# Patient Record
Sex: FEMALE | Race: WHITE | Employment: OTHER | URBAN - METROPOLITAN AREA
[De-identification: names, ages, dates, MRNs, and addresses within clinical notes are randomized per-mention and may not be internally consistent; named-entity substitution may affect disease eponyms.]

---

## 2017-01-20 PROBLEM — B00.1 FEVER BLISTER: Status: ACTIVE | Noted: 2017-01-20

## 2017-07-06 ENCOUNTER — HOSPITAL ENCOUNTER (OUTPATIENT)
Dept: MAMMOGRAPHY | Age: 62
Discharge: HOME | End: 2017-07-06
Attending: FAMILY MEDICINE
Payer: COMMERCIAL

## 2017-07-06 DIAGNOSIS — Z12.31 VISIT FOR SCREENING MAMMOGRAM: ICD-10-CM

## 2017-07-06 PROCEDURE — 77067 SCR MAMMO BI INCL CAD: CPT

## 2018-08-22 ENCOUNTER — HOSPITAL ENCOUNTER (OUTPATIENT)
Dept: MAMMOGRAPHY | Age: 63
Discharge: HOME | End: 2018-08-22
Attending: FAMILY MEDICINE
Payer: MEDICARE

## 2018-08-22 DIAGNOSIS — Z12.31 VISIT FOR SCREENING MAMMOGRAM: ICD-10-CM

## 2018-08-22 PROCEDURE — 77067 SCR MAMMO BI INCL CAD: CPT

## 2018-11-20 ENCOUNTER — HOSPITAL ENCOUNTER (OUTPATIENT)
Dept: LAB | Age: 63
Discharge: HOME | End: 2018-11-20

## 2018-11-20 PROCEDURE — 88305 TISSUE EXAM BY PATHOLOGIST: CPT

## 2019-05-27 ENCOUNTER — HOSPITAL ENCOUNTER (EMERGENCY)
Age: 64
Discharge: HOME | End: 2019-05-27
Attending: EMERGENCY MEDICINE | Admitting: EMERGENCY MEDICINE
Payer: MEDICARE

## 2019-05-27 ENCOUNTER — APPOINTMENT (OUTPATIENT)
Dept: CT IMAGING | Age: 64
End: 2019-05-27
Attending: EMERGENCY MEDICINE
Payer: MEDICARE

## 2019-05-27 VITALS
RESPIRATION RATE: 15 BRPM | DIASTOLIC BLOOD PRESSURE: 67 MMHG | WEIGHT: 151 LBS | HEIGHT: 64 IN | OXYGEN SATURATION: 98 % | TEMPERATURE: 98.5 F | BODY MASS INDEX: 25.78 KG/M2 | HEART RATE: 66 BPM | SYSTOLIC BLOOD PRESSURE: 143 MMHG

## 2019-05-27 DIAGNOSIS — E87.6 HYPOKALEMIA: Primary | ICD-10-CM

## 2019-05-27 DIAGNOSIS — R47.1 DYSARTHRIA: ICD-10-CM

## 2019-05-27 DIAGNOSIS — R25.2 MUSCLE CRAMPS: ICD-10-CM

## 2019-05-27 LAB
ALBUMIN SERPL-MCNC: 3.9 G/DL (ref 3.2–4.6)
ALBUMIN/GLOB SERPL: 1.2 {RATIO} (ref 1.2–3.5)
ALP SERPL-CCNC: 92 U/L (ref 50–136)
ALT SERPL-CCNC: 29 U/L (ref 12–65)
ANION GAP SERPL CALC-SCNC: 13 MMOL/L (ref 7–16)
AST SERPL-CCNC: 22 U/L (ref 15–37)
ATRIAL RATE: 92 BPM
BASOPHILS # BLD: 0 K/UL (ref 0–0.2)
BASOPHILS NFR BLD: 0 % (ref 0–2)
BILIRUB DIRECT SERPL-MCNC: 0.1 MG/DL
BILIRUB SERPL-MCNC: 0.5 MG/DL (ref 0.2–1.1)
BUN SERPL-MCNC: 25 MG/DL (ref 8–23)
CALCIUM SERPL-MCNC: 8.7 MG/DL (ref 8.3–10.4)
CALCULATED P AXIS, ECG09: 45 DEGREES
CALCULATED R AXIS, ECG10: 22 DEGREES
CALCULATED T AXIS, ECG11: 34 DEGREES
CHLORIDE SERPL-SCNC: 107 MMOL/L (ref 98–107)
CO2 SERPL-SCNC: 22 MMOL/L (ref 21–32)
CREAT SERPL-MCNC: 0.9 MG/DL (ref 0.6–1)
DIAGNOSIS, 93000: NORMAL
DIFFERENTIAL METHOD BLD: NORMAL
EOSINOPHIL # BLD: 0.1 K/UL (ref 0–0.8)
EOSINOPHIL NFR BLD: 1 % (ref 0.5–7.8)
ERYTHROCYTE [DISTWIDTH] IN BLOOD BY AUTOMATED COUNT: 14.6 % (ref 11.9–14.6)
GLOBULIN SER CALC-MCNC: 3.3 G/DL (ref 2.3–3.5)
GLUCOSE BLD STRIP.AUTO-MCNC: 124 MG/DL (ref 65–100)
GLUCOSE SERPL-MCNC: 118 MG/DL (ref 65–100)
HCT VFR BLD AUTO: 43.9 % (ref 35.8–46.3)
HGB BLD-MCNC: 15 G/DL (ref 11.7–15.4)
IMM GRANULOCYTES # BLD AUTO: 0 K/UL (ref 0–0.5)
IMM GRANULOCYTES NFR BLD AUTO: 0 % (ref 0–5)
INR BLD: 1 (ref 0.9–1.2)
INR PPP: 1
LYMPHOCYTES # BLD: 3 K/UL (ref 0.5–4.6)
LYMPHOCYTES NFR BLD: 31 % (ref 13–44)
MCH RBC QN AUTO: 29.2 PG (ref 26.1–32.9)
MCHC RBC AUTO-ENTMCNC: 34.2 G/DL (ref 31.4–35)
MCV RBC AUTO: 85.4 FL (ref 79.6–97.8)
MONOCYTES # BLD: 0.7 K/UL (ref 0.1–1.3)
MONOCYTES NFR BLD: 7 % (ref 4–12)
NEUTS SEG # BLD: 5.6 K/UL (ref 1.7–8.2)
NEUTS SEG NFR BLD: 60 % (ref 43–78)
NRBC # BLD: 0 K/UL (ref 0–0.2)
P-R INTERVAL, ECG05: 156 MS
PLATELET # BLD AUTO: 285 K/UL (ref 150–450)
PMV BLD AUTO: 9.5 FL (ref 9.4–12.3)
POTASSIUM SERPL-SCNC: 2.7 MMOL/L (ref 3.5–5.1)
PROT SERPL-MCNC: 7.2 G/DL (ref 6.3–8.2)
PROTHROMBIN TIME: 12.6 SEC (ref 11.7–14.5)
PT BLD: 11.8 SECS (ref 9.6–11.6)
Q-T INTERVAL, ECG07: 326 MS
QRS DURATION, ECG06: 90 MS
QTC CALCULATION (BEZET), ECG08: 403 MS
RBC # BLD AUTO: 5.14 M/UL (ref 4.05–5.2)
SODIUM SERPL-SCNC: 142 MMOL/L (ref 136–145)
TROPONIN I BLD-MCNC: 0.01 NG/ML (ref 0.02–0.05)
VENTRICULAR RATE, ECG03: 92 BPM
WBC # BLD AUTO: 9.4 K/UL (ref 4.3–11.1)

## 2019-05-27 PROCEDURE — 94762 N-INVAS EAR/PLS OXIMTRY CONT: CPT | Performed by: EMERGENCY MEDICINE

## 2019-05-27 PROCEDURE — 80048 BASIC METABOLIC PNL TOTAL CA: CPT

## 2019-05-27 PROCEDURE — 93005 ELECTROCARDIOGRAM TRACING: CPT | Performed by: EMERGENCY MEDICINE

## 2019-05-27 PROCEDURE — 84484 ASSAY OF TROPONIN QUANT: CPT

## 2019-05-27 PROCEDURE — 74011250636 HC RX REV CODE- 250/636: Performed by: EMERGENCY MEDICINE

## 2019-05-27 PROCEDURE — 96366 THER/PROPH/DIAG IV INF ADDON: CPT | Performed by: EMERGENCY MEDICINE

## 2019-05-27 PROCEDURE — 85610 PROTHROMBIN TIME: CPT

## 2019-05-27 PROCEDURE — 99285 EMERGENCY DEPT VISIT HI MDM: CPT | Performed by: EMERGENCY MEDICINE

## 2019-05-27 PROCEDURE — 80076 HEPATIC FUNCTION PANEL: CPT

## 2019-05-27 PROCEDURE — 85025 COMPLETE CBC W/AUTO DIFF WBC: CPT

## 2019-05-27 PROCEDURE — 74011250637 HC RX REV CODE- 250/637: Performed by: EMERGENCY MEDICINE

## 2019-05-27 PROCEDURE — 70450 CT HEAD/BRAIN W/O DYE: CPT

## 2019-05-27 PROCEDURE — 82962 GLUCOSE BLOOD TEST: CPT

## 2019-05-27 PROCEDURE — 96365 THER/PROPH/DIAG IV INF INIT: CPT | Performed by: EMERGENCY MEDICINE

## 2019-05-27 RX ORDER — POTASSIUM CHLORIDE 14.9 MG/ML
20 INJECTION INTRAVENOUS
Status: COMPLETED | OUTPATIENT
Start: 2019-05-27 | End: 2019-05-27

## 2019-05-27 RX ORDER — POTASSIUM CHLORIDE 20 MEQ/1
40 TABLET, EXTENDED RELEASE ORAL
Status: COMPLETED | OUTPATIENT
Start: 2019-05-27 | End: 2019-05-27

## 2019-05-27 RX ORDER — POTASSIUM CHLORIDE 1500 MG/1
20 TABLET, FILM COATED, EXTENDED RELEASE ORAL 2 TIMES DAILY
Qty: 6 TAB | Refills: 0 | Status: SHIPPED | OUTPATIENT
Start: 2019-05-27 | End: 2021-07-15

## 2019-05-27 RX ORDER — POTASSIUM CHLORIDE 29.8 MG/ML
20 INJECTION INTRAVENOUS
Status: DISCONTINUED | OUTPATIENT
Start: 2019-05-27 | End: 2019-05-27

## 2019-05-27 RX ORDER — SODIUM CHLORIDE 0.9 % (FLUSH) 0.9 %
5-40 SYRINGE (ML) INJECTION EVERY 8 HOURS
Status: DISCONTINUED | OUTPATIENT
Start: 2019-05-27 | End: 2019-05-27 | Stop reason: HOSPADM

## 2019-05-27 RX ORDER — POTASSIUM CHLORIDE 14.9 MG/ML
20 INJECTION INTRAVENOUS
Status: DISCONTINUED | OUTPATIENT
Start: 2019-05-27 | End: 2019-05-27

## 2019-05-27 RX ORDER — SODIUM CHLORIDE 0.9 % (FLUSH) 0.9 %
5-40 SYRINGE (ML) INJECTION AS NEEDED
Status: DISCONTINUED | OUTPATIENT
Start: 2019-05-27 | End: 2019-05-27 | Stop reason: HOSPADM

## 2019-05-27 RX ADMIN — POTASSIUM CHLORIDE 40 MEQ: 20 TABLET, EXTENDED RELEASE ORAL at 12:55

## 2019-05-27 RX ADMIN — SODIUM CHLORIDE 1000 ML: 900 INJECTION, SOLUTION INTRAVENOUS at 11:47

## 2019-05-27 RX ADMIN — POTASSIUM CHLORIDE 20 MEQ: 200 INJECTION, SOLUTION INTRAVENOUS at 13:41

## 2019-05-27 NOTE — DISCHARGE INSTRUCTIONS
Hypokalemia: Care Instructions  Your Care Instructions    Hypokalemia (say \"oz-ci-xil-MARYANA-alejandrina-uh\") is a low level of potassium. The heart, muscles, kidneys, and nervous system all need potassium to work well. This problem has many different causes. Kidney problems, diet, and medicines like diuretics and laxatives can cause it. So can vomiting or diarrhea. In some cases, cancer is the cause. Your doctor may do tests to find the cause of your low potassium levels. You may need medicines to bring your potassium levels back to normal. You may also need regular blood tests to check your potassium. If you have very low potassium, you may need intravenous (IV) medicines. You also may need tests to check the electrical activity of your heart. Heart problems caused by low potassium levels can be very serious. Follow-up care is a key part of your treatment and safety. Be sure to make and go to all appointments, and call your doctor if you are having problems. It's also a good idea to know your test results and keep a list of the medicines you take. How can you care for yourself at home? · If your doctor recommends it, eat foods that have a lot of potassium. These include fresh fruits, juices, and vegetables. They also include nuts, beans, and milk. · Be safe with medicines. If your doctor prescribes medicines or potassium supplements, take them exactly as directed. Call your doctor if you have any problems with your medicines. · Get your potassium levels tested as often as your doctor tells you. When should you call for help? Call 911 anytime you think you may need emergency care. For example, call if:    · You feel like your heart is missing beats. Heart problems caused by low potassium can cause death.     · You passed out (lost consciousness).     · You have a seizure.    Call your doctor now or seek immediate medical care if:    · You feel weak or unusually tired.     · You have severe arm or leg cramps.   · You have tingling or numbness.     · You feel sick to your stomach, or you vomit.     · You have belly cramps.     · You feel bloated or constipated.     · You have to urinate a lot.     · You feel very thirsty most of the time.     · You are dizzy or lightheaded, or you feel like you may faint.     · You feel depressed, or you lose touch with reality.    Watch closely for changes in your health, and be sure to contact your doctor if:    · You do not get better as expected. Where can you learn more? Go to http://kari-salinas.info/. Enter G358 in the search box to learn more about \"Hypokalemia: Care Instructions. \"  Current as of: March 14, 2018  Content Version: 11.9  © 3206-1326 [employer]. Care instructions adapted under license by [employer] (which disclaims liability or warranty for this information). If you have questions about a medical condition or this instruction, always ask your healthcare professional. [place] [address] any warranty or liability for your use of this information. Patient Education        Hypokalemia: Care Instructions  Your Care Instructions    Hypokalemia (say \"dx-jy-qcr-MARYANA-alejandrina-uh\") is a low level of potassium. The heart, muscles, kidneys, and nervous system all need potassium to work well. This problem has many different causes. Kidney problems, diet, and medicines like diuretics and laxatives can cause it. So can vomiting or diarrhea. In some cases, cancer is the cause. Your doctor may do tests to find the cause of your low potassium levels. You may need medicines to bring your potassium levels back to normal. You may also need regular blood tests to check your potassium. If you have very low potassium, you may need intravenous (IV) medicines. You also may need tests to check the electrical activity of your heart. Heart problems caused by low potassium levels can be very serious.   Follow-up care is a key part of your treatment and safety. Be sure to make and go to all appointments, and call your doctor if you are having problems. It's also a good idea to know your test results and keep a list of the medicines you take. How can you care for yourself at home? · If your doctor recommends it, eat foods that have a lot of potassium. These include fresh fruits, juices, and vegetables. They also include nuts, beans, and milk. · Be safe with medicines. If your doctor prescribes medicines or potassium supplements, take them exactly as directed. Call your doctor if you have any problems with your medicines. · Get your potassium levels tested as often as your doctor tells you. When should you call for help? Call 911 anytime you think you may need emergency care. For example, call if:    · You feel like your heart is missing beats. Heart problems caused by low potassium can cause death.     · You passed out (lost consciousness).     · You have a seizure.    Call your doctor now or seek immediate medical care if:    · You feel weak or unusually tired.     · You have severe arm or leg cramps.     · You have tingling or numbness.     · You feel sick to your stomach, or you vomit.     · You have belly cramps.     · You feel bloated or constipated.     · You have to urinate a lot.     · You feel very thirsty most of the time.     · You are dizzy or lightheaded, or you feel like you may faint.     · You feel depressed, or you lose touch with reality.    Watch closely for changes in your health, and be sure to contact your doctor if:    · You do not get better as expected. Where can you learn more? Go to http://kari-salinas.info/. Enter G358 in the search box to learn more about \"Hypokalemia: Care Instructions. \"  Current as of: March 14, 2018  Content Version: 11.9  © 0240-2178 [employer].  Care instructions adapted under license by [facility] (which disclaims liability or warranty for this information). If you have questions about a medical condition or this instruction, always ask your healthcare professional. [place] [address] any warranty or liability for your use of this information.

## 2019-05-27 NOTE — ED NOTES
I have reviewed discharge instructions with the patient. The patient verbalized understanding. Patient left ED via Discharge Method: ambulatory to Home with family. Opportunity for questions and clarification provided. Patient given 1 scripts. To continue your aftercare when you leave the hospital, you may receive an automated call from our care team to check in on how you are doing. This is a free service and part of our promise to provide the best care and service to meet your aftercare needs.  If you have questions, or wish to unsubscribe from this service please call [phone]. Thank you for Choosing our [address] [place] [address] Emergency Department.

## 2019-05-27 NOTE — PROGRESS NOTES
Visit with patient in ER. Mariel Palencia, staff   C: [phone] /  [email or web address]

## 2019-05-27 NOTE — ED PROVIDER NOTES
52-year-old white female with history of hypertension but not requiring any medications presents with approximately 15 minute episode of hands drawing up and difficulty speaking. Symptoms resolved spontaneously. Patient reports he thinks he was having difficulty speaking because of the \"drawing up of her body\". Patient reports that she has walked approximately 2 miles this morning and was doing work in the yard when the symptoms began. Her  reports her speech just seemed a bit slurred but her words were appropriate. No vision changes. No headache. The history is provided by the patient. Dysarthria   Pertinent negatives include no shortness of breath, no chest pain, no vomiting, no headaches and no nausea.         Past Medical History:   Diagnosis Date    Arthritis     GERD (gastroesophageal reflux disease)     well controlled with medication    Hypercholesteremia     medication    Hypertension     well controlled with medication    Obesity        Past Surgical History:   Procedure Laterality Date    HX BREAST REDUCTION      HX COLONOSCOPY  2016,2018    + polyp  repeat 2023    HX HYSTERECTOMY      HX KNEE REPLACEMENT Right     HX OTHER SURGICAL      tummy tuck         Family History:   Problem Relation Age of Onset    Hypertension Mother     Hypertension Father     Breast Cancer Neg Hx        Social History     Socioeconomic History    Marital status:      Spouse name: Not on file    Number of children: Not on file    Years of education: Not on file    Highest education level: Not on file   Occupational History    Not on file   Social Needs    Financial resource strain: Not on file    Food insecurity:     Worry: Not on file     Inability: Not on file    Transportation needs:     Medical: Not on file     Non-medical: Not on file   Tobacco Use    Smoking status: Never Smoker    Smokeless tobacco: Never Used   Substance and Sexual Activity    Alcohol use: Yes     Comment: occasional    Drug use: No    Sexual activity: Not on file   Lifestyle    Physical activity:     Days per week: Not on file     Minutes per session: Not on file    Stress: Not on file   Relationships    Social connections:     Talks on phone: Not on file     Gets together: Not on file     Attends Anabaptist service: Not on file     Active member of club or organization: Not on file     Attends meetings of clubs or organizations: Not on file     Relationship status: Not on file    Intimate partner violence:     Fear of current or ex partner: Not on file     Emotionally abused: Not on file     Physically abused: Not on file     Forced sexual activity: Not on file   Other Topics Concern    Not on file   Social History Narrative    Not on file         ALLERGIES: Adhesive tape and Adhesive tape-silicones    Review of Systems   Constitutional: Negative for fever. HENT: Negative for congestion. Eyes: Negative for visual disturbance. Respiratory: Negative for cough and shortness of breath. Cardiovascular: Negative for chest pain. Gastrointestinal: Negative for abdominal pain, nausea and vomiting. Genitourinary: Negative for dysuria. Musculoskeletal: Negative for back pain and neck pain. Skin: Negative for rash. Neurological: Negative for headaches. Vitals:    05/27/19 1136 05/27/19 1151 05/27/19 1201 05/27/19 1231   BP: (!) 192/96 167/87 152/81 152/73   Pulse: 93 81 79 69   Resp: 16 16 13 13   Temp: 98.5 °F (36.9 °C)      SpO2: 94% 94% 96% 97%   Weight: 68.5 kg (151 lb)      Height: 5' 4\" (1.626 m)               Physical Exam   Constitutional: She is oriented to person, place, and time. She appears well-developed and well-nourished. No distress. HENT:   Head: Normocephalic and atraumatic. Mouth/Throat: Oropharynx is clear and moist.   Eyes: Pupils are equal, round, and reactive to light. Conjunctivae and EOM are normal.   Neck: Normal range of motion. Neck supple.    Cardiovascular: Normal rate and regular rhythm. Pulmonary/Chest: Effort normal and breath sounds normal.   Abdominal: Soft. She exhibits no distension. There is no tenderness. Musculoskeletal: Normal range of motion. She exhibits no edema. Neurological: She is alert and oriented to person, place, and time. She displays normal reflexes. No cranial nerve deficit or sensory deficit. She exhibits normal muscle tone. Coordination normal.   Skin: Skin is warm and dry. Psychiatric: She has a normal mood and affect. Her behavior is normal.   Nursing note and vitals reviewed. MDM  Number of Diagnoses or Management Options  Diagnosis management comments: Lab work unremarkable except for mild hypokalemia at 2.7. EKG normal sinus rhythm. Head CT no acute abnormality. Blood pressure has improved without intervention. Patient is very comfortable in appearance and has normal neuro exam.  I suspect her lateral extremity drawing and difficulty speaking or related to hypokalemia. Do not feel that admission is necessary at this time. Have her placed this orally and IV. We'll discharge home on [place] [address] for 3 days. [address] follow-up with primary care this week.        Amount and/or Complexity of Data Reviewed  Clinical lab tests: ordered and reviewed  Tests in the radiology section of CPT®: ordered and reviewed  Tests in the medicine section of CPT®: ordered and reviewed  Independent visualization of images, tracings, or specimens: yes    Risk of Complications, Morbidity, and/or Mortality  Presenting problems: moderate  Diagnostic procedures: moderate  Management options: moderate           Procedures

## 2019-05-27 NOTE — ED TRIAGE NOTES
Pt here via ems from home for slurred speech and contractures that started at 10AM. Pt states speech has improved and sx have resolved at this time. Cinn negative. Pt is alert and oriented x 4. Ems vs: 180/100 HR 90's .

## 2019-12-17 ENCOUNTER — HOSPITAL ENCOUNTER (OUTPATIENT)
Dept: MAMMOGRAPHY | Age: 64
Discharge: HOME | End: 2019-12-17
Attending: FAMILY MEDICINE
Payer: MEDICARE

## 2019-12-17 DIAGNOSIS — M89.9 DISORDER OF BONE: ICD-10-CM

## 2019-12-17 DIAGNOSIS — Z12.31 VISIT FOR SCREENING MAMMOGRAM: ICD-10-CM

## 2019-12-17 PROCEDURE — 77067 SCR MAMMO BI INCL CAD: CPT

## 2019-12-17 PROCEDURE — 77080 DXA BONE DENSITY AXIAL: CPT

## 2020-11-20 ENCOUNTER — TRANSCRIBE ORDER (OUTPATIENT)
Dept: SCHEDULING | Age: 65
End: 2020-11-20

## 2020-11-20 DIAGNOSIS — Z12.31 VISIT FOR SCREENING MAMMOGRAM: Primary | ICD-10-CM

## 2020-12-18 ENCOUNTER — HOSPITAL ENCOUNTER (OUTPATIENT)
Dept: MAMMOGRAPHY | Age: 65
Discharge: HOME | End: 2020-12-18
Attending: FAMILY MEDICINE
Payer: MEDICARE

## 2020-12-18 DIAGNOSIS — Z12.31 VISIT FOR SCREENING MAMMOGRAM: ICD-10-CM

## 2020-12-18 PROCEDURE — 77067 SCR MAMMO BI INCL CAD: CPT

## 2021-05-26 LAB
LEFT VENTRICULAR EJECTION FRACTION HIGH VALUE: 65 %
LEFT VENTRICULAR EJECTION FRACTION MODE: NORMAL
LV EF: 55 %

## 2021-07-23 ENCOUNTER — HOSPITAL ENCOUNTER (OUTPATIENT)
Dept: ULTRASOUND IMAGING | Age: 66
Discharge: HOME | End: 2021-07-23
Attending: INTERNAL MEDICINE

## 2021-07-23 DIAGNOSIS — R22.1 NODULE OF NECK: ICD-10-CM

## 2021-07-26 NOTE — PROGRESS NOTES
Blayne Miller, please let patient know her ultrasound results showed a small cyst. If this is bothering her, I can refer her to a dermatologist to have it removed. It otherwise can be left alone.

## 2021-07-28 NOTE — PROGRESS NOTES
Patient notified of results and instructions, per Dr Israel Mortensen note, and voiced understanding. She states that it is not bothering much her at this time.

## 2021-10-20 PROBLEM — H49.00 OCULOMOTOR NERVE PALSY: Status: ACTIVE | Noted: 2021-10-20

## 2021-12-29 ENCOUNTER — TRANSCRIBE ORDER (OUTPATIENT)
Dept: SCHEDULING | Age: 66
End: 2021-12-29

## 2021-12-29 DIAGNOSIS — Z12.31 VISIT FOR SCREENING MAMMOGRAM: Primary | ICD-10-CM

## 2022-02-25 ENCOUNTER — HOSPITAL ENCOUNTER (OUTPATIENT)
Dept: MAMMOGRAPHY | Age: 67
Discharge: HOME | End: 2022-02-25
Attending: INTERNAL MEDICINE
Payer: MEDICARE

## 2022-02-25 DIAGNOSIS — Z12.31 VISIT FOR SCREENING MAMMOGRAM: ICD-10-CM

## 2022-02-25 PROCEDURE — 77067 SCR MAMMO BI INCL CAD: CPT

## 2022-03-16 ENCOUNTER — HOSPITAL ENCOUNTER (OUTPATIENT)
Dept: NUTRITION | Age: 67
Discharge: HOME | End: 2022-03-16
Payer: SELF-PAY

## 2022-03-16 PROCEDURE — 97802 MEDICAL NUTRITION INDIV IN: CPT

## 2022-03-16 NOTE — PROGRESS NOTES
Dilia Bergman, MS, RD, LD  Outpatient Registered Dietitian  [address] [facility] Outpatient Nutrition Counseling  Phone: [phone]  Fax: [phone]    ASSESSMENT  Pt is a 74 y.o. female referred for dietary counseling and education by Dr. Omi De La Rosa with the following diagnosis (es):  Overweight [E66.3]     Comes in today accompanied by her spouse, requesting support for weight reduction. Following ketogenic diet for 8 months, will \"cheat\" here and there\". Voices and shows weight increasing even though she is restricting her intake. C/o constipation since returning from [place], which she attribute eliminating her fruit intake since she returned, finds relief with stool softner. Finds she is low in energy, difficulty staying awake, sleeping during the day. Ready to consider alternative approach to ketogenic diet today for health and longevity. Factors identified today that impact current eating habits include confusion about nutrition advice, nutrition knowledge, and subscribing to diet culture. 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       Lab Results   Component Value Date/Time    Hemoglobin A1c 5.3 02/21/2019 09:02 AM    Hemoglobin A1c 5.3 08/21/2018 08:44 AM    Hemoglobin A1c 5.6 01/19/2018 10:32 AM    Glucose 118 (H) 05/27/2019 11:48 AM    Glucose (POC) 124 (H) 05/27/2019 11:33 AM    LDL, calculated 222 (H) 02/21/2019 09:02 AM    Creatinine 0.90 05/27/2019 11:48 AM      Lab Results   Component Value Date/Time    Cholesterol, total 294 (H) 02/21/2019 09:02 AM    HDL Cholesterol 55 02/21/2019 09:02 AM    LDL, calculated 222 (H) 02/21/2019 09:02 AM    Triglyceride 87 02/21/2019 09:02 AM     Lab Results   Component Value Date/Time    ALT (SGPT) 29 05/27/2019 11:48 AM    Alk.  phosphatase 92 05/27/2019 11:48 AM Bilirubin, direct 0.1 05/27/2019 11:48 AM    Bilirubin, total 0.5 05/27/2019 11:48 AM    Albumin 3.9 05/27/2019 11:48 AM    Protein, total 7.2 05/27/2019 11:48 AM    INR 1.0 05/27/2019 11:48 AM    INR (POC) 1.0 05/27/2019 11:41 AM    Prothrombin time 12.6 05/27/2019 11:48 AM    PLATELET 127 78/94/7310 11:48 AM     Meds: bumex, statin    Barriers to change: nutrition knowledge deficit and lacks applicable skills- cooking, meal planning, time management, etc.    Social/ Support: Spouse, who shares he has elevated blood sugars and needs to be careful. Huy Jaquez expresses she has been recommended not to cook foods due to early onset of dementia. Spouse is cooking meals at this time. Diet Recall: snacks on casimiro bars- causes her gas, peanuts. Breakfast: None  Lunch: None  Dinner: 2 ounces ham with cheese sandwich on \"diet bread\" + keto chips    Physical Activity: inconsistent - no set duration or frequency- starting to walk again with her neighbors. 2 miles. Sleep Habits: poor- voices nightmares. Behaviors indicate current stage of change is: Preparation. Anthropometrics:   Estimated body mass index is 29.18 kg/m² as calculated from the following:    Height as of 10/20/21: 5' 4\" (1.626 m). Weight as of 10/20/21: 77.1 kg (170 lb). - on bumix  IBW= 54.5kg      Estimated Nutrition Needs:  MSJ x 1.2= 1500 (-200 for weight reduction to start)= 1300 kcal/day - first aim to to consume 3 meals. Protein: 65g/day (20%)    Carbohydrate: 130g/day (40%)    Fat:58 g/day (40%)   Concerns for metabolic dysregulation due to chronic dieting, poor sleep    DIAGNOSIS:  Inadequate energy intake related to nutrition knowledge, related to desire to lose weight and subscribing to ketogenic diet culture    INTERVENTIONS:  1. 60 minute appointment    2.  Nutrition Prescription:  Modification of Meals and Snacks (RD Goals for pt):   - Increase servings of nonstarchy vegetables from baseline  - Replace saturated fat sources with unsaturated fat (nuts, seeds, avocado, olive oil)  - Decrease saturated fat intake to <13 grams per day   - Increase fiber intake to minimum of 25g/day (whole grains, fruit, vegetables)  - Daily- weekly consumption of dairy, poultry, eggs  - Consume fish 2 or more times weekly  - Limit refined carbohydrates, added sugar, sugar sweetened beverages, processed meats, and fried foods to 1-2x per week or less  - Water as primary beverage    3. Nutrition Education and Counseling   Utilized motivational interviewing, problem solving, self monitoring, cognitive restructuring, and goal setting in behavior change counseling   Reviewed MNT for weight management. Specifically emphasized the importance of eating 3 structured meals daily to support metabolism and general wellbeing.  Provided recommended portions of carbohydrate, protein, and fat as guide for meals and snacks.  Discussed recommendations in the context of MyPlate.  Application: created 3 structured meals together, applied food preferences to meal plan. Materials Provided and Discussed:  Individualized MyPlate, provided energy and macro nutrient needs. Cronometer nate recommended for tracking upon request.     Utilized the following behavior change strategies: Pt identified behaviors address and collaborated in goal setting,     Pt verbalized understanding of recommendations discussed. Anticipate fair compliance with recommendations. Goal: Pt will structure meals and change food choices to facilitate 3-5% weight loss within 1 months. -  Action Steps:           1. Track meals for 7 days          2. Eat 3 meals daily to support improvement in energy- Follow my plate structure to create meals           3. Incorporate fiber/protein rich carbohydrates back into diet - reviewed recommended list and servings daily. MONITORING & EVALUATION:  Monitor food/nutrient intake  Follow Up: 3 weeks.

## 2022-03-19 PROBLEM — H49.00 OCULOMOTOR NERVE PALSY: Status: ACTIVE | Noted: 2021-10-20

## 2022-03-20 PROBLEM — B00.1 FEVER BLISTER: Status: ACTIVE | Noted: 2017-01-20

## 2022-03-21 PROBLEM — R41.89 COGNITIVE IMPAIRMENT: Status: ACTIVE | Noted: 2022-03-21

## 2022-03-21 PROBLEM — F02.80 LEWY BODY DEMENTIA WITHOUT BEHAVIORAL DISTURBANCE (HCC): Status: ACTIVE | Noted: 2022-03-21

## 2022-03-21 PROBLEM — E87.6 HYPOKALEMIA: Status: ACTIVE | Noted: 2022-03-21

## 2022-03-21 PROBLEM — R73.03 PREDIABETES: Status: ACTIVE | Noted: 2022-03-21

## 2022-03-21 PROBLEM — I50.32 CHRONIC DIASTOLIC CONGESTIVE HEART FAILURE (HCC): Status: ACTIVE | Noted: 2022-03-21

## 2022-03-21 PROBLEM — B00.1 FEVER BLISTER: Status: RESOLVED | Noted: 2017-01-20 | Resolved: 2022-03-21

## 2022-03-21 PROBLEM — E03.9 ACQUIRED HYPOTHYROIDISM: Status: ACTIVE | Noted: 2022-03-21

## 2022-03-21 PROBLEM — G31.83 LEWY BODY DEMENTIA WITHOUT BEHAVIORAL DISTURBANCE (HCC): Status: ACTIVE | Noted: 2022-03-21

## 2022-03-24 PROBLEM — G31.83 LEWY BODY DEMENTIA WITHOUT BEHAVIORAL DISTURBANCE (HCC): Status: ACTIVE | Noted: 2022-03-21

## 2022-03-24 PROBLEM — E87.6 HYPOKALEMIA: Status: ACTIVE | Noted: 2022-03-21

## 2022-03-24 PROBLEM — E03.9 ACQUIRED HYPOTHYROIDISM: Status: ACTIVE | Noted: 2022-03-21

## 2022-03-24 PROBLEM — B00.1 FEVER BLISTER: Status: RESOLVED | Noted: 2017-01-20 | Resolved: 2022-03-21

## 2022-03-24 PROBLEM — R73.03 PREDIABETES: Status: ACTIVE | Noted: 2022-03-21

## 2022-03-24 PROBLEM — F02.80 LEWY BODY DEMENTIA WITHOUT BEHAVIORAL DISTURBANCE (HCC): Status: ACTIVE | Noted: 2022-03-21

## 2022-03-24 PROBLEM — I50.32 CHRONIC DIASTOLIC CONGESTIVE HEART FAILURE (HCC): Status: ACTIVE | Noted: 2022-03-21

## 2022-03-24 PROBLEM — R41.89 COGNITIVE IMPAIRMENT: Status: ACTIVE | Noted: 2022-03-21

## 2022-04-20 ENCOUNTER — TELEPHONE (OUTPATIENT)
Dept: NUTRITION | Age: 67
End: 2022-04-20

## 2022-04-20 NOTE — TELEPHONE ENCOUNTER
Nutrition Counseling: Contacted pt regarding referral. See notes documented in Nutrition Counseling Referral for details. No further follow-up contact from pt. Will close referral for this office.     [address] [facility]  [phone]

## 2022-05-12 PROBLEM — E87.6 HYPOKALEMIA: Status: RESOLVED | Noted: 2022-03-21 | Resolved: 2022-05-12

## 2022-05-12 PROBLEM — J30.1 SEASONAL ALLERGIC RHINITIS DUE TO POLLEN: Status: ACTIVE | Noted: 2022-05-12

## 2022-05-12 PROBLEM — K59.00 CONSTIPATION: Status: ACTIVE | Noted: 2022-05-12

## 2022-05-12 PROBLEM — N76.0 ACUTE VAGINITIS: Status: ACTIVE | Noted: 2022-05-12

## 2022-05-12 PROBLEM — K92.89 GAS BLOAT SYNDROME: Status: ACTIVE | Noted: 2022-05-12

## 2022-05-12 PROBLEM — N18.30 CHRONIC RENAL DISEASE, STAGE III (HCC): Status: ACTIVE | Noted: 2022-05-12

## 2022-05-27 ENCOUNTER — PATIENT MESSAGE (OUTPATIENT)
Dept: INTERNAL MEDICINE CLINIC | Facility: CLINIC | Age: 67
End: 2022-05-27

## 2022-05-27 RX ORDER — METOPROLOL SUCCINATE 50 MG/1
50 TABLET, EXTENDED RELEASE ORAL DAILY
Qty: 90 TABLET | Refills: 0 | Status: SHIPPED | OUTPATIENT
Start: 2022-05-27 | End: 2022-09-07 | Stop reason: SDUPTHER

## 2022-05-27 RX ORDER — BUMETANIDE 1 MG/1
1 TABLET ORAL DAILY
Qty: 90 TABLET | Refills: 0 | Status: SHIPPED | OUTPATIENT
Start: 2022-05-27 | End: 2022-08-22

## 2022-05-27 NOTE — TELEPHONE ENCOUNTER
From: Sherrie Armijo  To: Dr. Bianca Esteban  Sent: 5/27/2022 9:34 AM EDT  Subject: Refills needed    I have run out of my Bumetanide 1mg tablets and my Metropolo 50 mg pills and it says they are not renewable online. Please send refills to [facility] on [address].     Annetta Flores

## 2022-06-06 ENCOUNTER — TELEPHONE (OUTPATIENT)
Dept: INTERNAL MEDICINE CLINIC | Facility: CLINIC | Age: 67
End: 2022-06-06

## 2022-06-06 NOTE — TELEPHONE ENCOUNTER
Spoke with patient advised per  he will need to be seen and he doesn't have any availability this week.  Gave appt with Heather,Np for tomorrow at 3:20 pm.

## 2022-06-07 ENCOUNTER — OFFICE VISIT (OUTPATIENT)
Dept: INTERNAL MEDICINE CLINIC | Facility: CLINIC | Age: 67
End: 2022-06-07
Payer: MEDICARE

## 2022-06-07 VITALS
BODY MASS INDEX: 30.22 KG/M2 | TEMPERATURE: 97.4 F | OXYGEN SATURATION: 100 % | DIASTOLIC BLOOD PRESSURE: 63 MMHG | HEART RATE: 52 BPM | SYSTOLIC BLOOD PRESSURE: 145 MMHG | WEIGHT: 177 LBS | HEIGHT: 64 IN

## 2022-06-07 DIAGNOSIS — N76.0 ACUTE VAGINITIS: Primary | ICD-10-CM

## 2022-06-07 PROCEDURE — 1123F ACP DISCUSS/DSCN MKR DOCD: CPT | Performed by: NURSE PRACTITIONER

## 2022-06-07 PROCEDURE — 99213 OFFICE O/P EST LOW 20 MIN: CPT | Performed by: NURSE PRACTITIONER

## 2022-06-07 RX ORDER — METRONIDAZOLE 500 MG/1
500 TABLET ORAL 2 TIMES DAILY
Qty: 14 TABLET | Refills: 0 | Status: SHIPPED | OUTPATIENT
Start: 2022-06-07 | End: 2022-06-14

## 2022-06-07 RX ORDER — FLUCONAZOLE 150 MG/1
150 TABLET ORAL ONCE
Qty: 1 TABLET | Refills: 0 | Status: SHIPPED | OUTPATIENT
Start: 2022-06-07 | End: 2022-06-07

## 2022-06-07 SDOH — ECONOMIC STABILITY: FOOD INSECURITY: WITHIN THE PAST 12 MONTHS, THE FOOD YOU BOUGHT JUST DIDN'T LAST AND YOU DIDN'T HAVE MONEY TO GET MORE.: NEVER TRUE

## 2022-06-07 SDOH — ECONOMIC STABILITY: FOOD INSECURITY: WITHIN THE PAST 12 MONTHS, YOU WORRIED THAT YOUR FOOD WOULD RUN OUT BEFORE YOU GOT MONEY TO BUY MORE.: NEVER TRUE

## 2022-06-07 ASSESSMENT — ENCOUNTER SYMPTOMS
COUGH: 0
SHORTNESS OF BREATH: 0
ABDOMINAL PAIN: 0
CONSTIPATION: 0
EYE PAIN: 0
VOMITING: 0
BACK PAIN: 0
SINUS PAIN: 0
NAUSEA: 0
SORE THROAT: 0
RHINORRHEA: 0
DIARRHEA: 0

## 2022-06-07 ASSESSMENT — PATIENT HEALTH QUESTIONNAIRE - PHQ9
SUM OF ALL RESPONSES TO PHQ9 QUESTIONS 1 & 2: 0
SUM OF ALL RESPONSES TO PHQ QUESTIONS 1-9: 0
1. LITTLE INTEREST OR PLEASURE IN DOING THINGS: 0
SUM OF ALL RESPONSES TO PHQ QUESTIONS 1-9: 0
2. FEELING DOWN, DEPRESSED OR HOPELESS: 0

## 2022-06-07 ASSESSMENT — SOCIAL DETERMINANTS OF HEALTH (SDOH)
HOW HARD IS IT FOR YOU TO PAY FOR THE VERY BASICS LIKE FOOD, HOUSING, MEDICAL CARE, AND HEATING?: NOT HARD AT ALL
HOW HARD IS IT FOR YOU TO PAY FOR THE VERY BASICS LIKE FOOD, HOUSING, MEDICAL CARE, AND HEATING?: NOT HARD AT ALL

## 2022-06-07 NOTE — PATIENT INSTRUCTIONS
Patient Education        Vaginitis: Care Instructions  Overview     Vaginitis is soreness or infection of the vagina. This common problem can cause itching and burning. And it can cause a change in vaginal discharge. Sometimes it can cause pain during sex. Vaginitis may be caused by bacteria, yeast, or other germs. Some infections that cause it are caught from a sexual partner. Bath products, spermicides, and douches can irritate the vagina too. This problem can also happen during and after menopause. A drop in estrogenlevels during this time can cause dryness, soreness, and pain during sex. Your doctor can give you medicine to treat vaginitis. And home care may help you feel better. For certain types of infections, your sex partner(s) must betreated too. Follow-up care is a key part of your treatment and safety. Be sure to make and go to all appointments, and call your doctor if you are having problems. It's also a good idea to know your test results and keep alist of the medicines you take. How can you care for yourself at home?  Take your medicines exactly as prescribed. Call your doctor if you think you are having a problem with your medicine.  Ask your doctor if your sex partner(s) also needs treatment.  Do not eat or drink anything that has alcohol if you are taking metronidazole (Flagyl).  Wash your vulva daily with water. You also can use a mild, unscented soap if you want.  Do not use scented bath products. And do not use vaginal sprays or douches.  Change out of wet or damp clothes as soon as possible. Wear cotton underwear. And avoid tight clothing that could increase moisture.  Put a washcloth soaked in cool water on the area to relieve itching. Or you can take cool baths.  If you have dryness because of menopause, use estrogen cream or pills that your doctor prescribes.  Ask your doctor about when it is okay to have sex.  Use a personal lubricant before sex if you have dryness. Examples are Astroglide, K-Y Jelly, and Wet Lubricant Gel. When should you call for help? Call your doctor now or seek immediate medical care if:     You have a fever.      You have new or increased pain in your vagina or pelvis.      You have new or worse vaginal itching or discharge. Watch closely for changes in your health, and be sure to contact your doctor if:     You have bleeding other than your period.      You do not get better as expected. Where can you learn more? Go to https://ParentPluspeStroho.CoachUp. org and sign in to your [employer] account. Enter E623 in the Viewdle box to learn more about \"Vaginitis: Care Instructions. \"     If you do not have an account, please click on the \"Sign Up Now\" link. Current as of: November 22, 2021               Content Version: 13.2  © 3160-4790 [employer]. Care instructions adapted under license by [facility] [place] ([facility]). If you have questions about a medical condition or this instruction, always ask your healthcare professional. [address] any warranty or liability for your use of this information. Patient Education        Bacterial Vaginosis: Care Instructions  Overview     Bacterial vaginosis is a type of vaginal infection. It is caused by excess growth of certain bacteria that are normally found in the vagina. Symptoms can include itching, swelling, pain when you urinate or have sex, and a gray or yellow discharge with a \"fishy\" odor. It is not considered an infection that isspread through sexual contact. Symptoms can be annoying and uncomfortable. But bacterial vaginosis does not usually cause other health problems. However, if you have it while you arepregnant, it can cause complications. While the infection may go away on its own, most doctors use antibiotics to treat it.  You may have been prescribed pills or vaginal cream. With treatment,bacterial vaginosis usually clears up in 5 to 7 days.  Follow-up care is a key part of your treatment and safety. Be sure to make and go to all appointments, and call your doctor if you are having problems. It's also a good idea to know your test results and keep alist of the medicines you take. How can you care for yourself at home?  Take your antibiotics as directed. Do not stop taking them just because you feel better. You need to take the full course of antibiotics.  Do not eat or drink anything that contains alcohol if you are taking metronidazole or tinidazole.  Keep using your medicine if you start your period. Use pads instead of tampons while using a vaginal cream or suppository. Tampons can absorb the medicine.  Wear loose cotton clothing. Do not wear nylon and other materials that hold body heat and moisture close to the skin.  Do not scratch. Relieve itching with a cold pack or a cool bath.  Do not wash your vaginal area more than once a day. Use plain water or a mild, unscented soap. Do not douche. When should you call for help? Watch closely for changes in your health, and be sure to contact your doctor if:     You have unexpected vaginal bleeding.      You have a fever.      You have new or increased pain in your vagina or pelvis.      You are not getting better after 1 week.      Your symptoms return after you finish the course of your medicine. Where can you learn more? Go to https://WHI Solutionsammieeb.[employer]i Group. org and sign in to your [employer] account. Enter T423 in the [facility] box to learn more about \"Bacterial Vaginosis: Care Instructions. \"     If you do not have an account, please click on the \"Sign Up Now\" link. Current as of: November 22, 2021               Content Version: 13.2  © 7350-9506 Healthwise, LOG607. Care instructions adapted under license by [facility] ([facility]).  If you have questions about a medical condition or this instruction, always ask your healthcare professional. [employer] disclaims any warranty or liability for your use of this information.

## 2022-06-07 NOTE — PROGRESS NOTES
[address]  Tel# [phone]  Fax# [phone]       Jackson White, [facility]-BC  Family Nurse Practitioner            Date of Visit: 2022     Nelda Gonzales (: 1955) is a 69 y.o. female  patient, here for evaluation of the following chief complaint(s):    Chief Complaint   Patient presents with    Vaginal Discharge     Vaginal Discharge/ Rectal discharge          Patient Care Team:  Swathi Barriga MD as PCP - Go Brown MD as PCP - [facility] Empaneled Provider         History of Present Illness      Vaginal discharge  Presents here with complaint of recurring vaginal discharge; recurred about 1-1.5 weeks ago. Had a discharge back in 2022 when she was at the beach; had visit with me on 2022 re: vaginal discharge, and states that after taking Fluconazole, discharge resolved. States her spouse can smell a \"whiff\" of her vaginal discharge, states \"not bad\". Pt brought in her underwear, showing dried yellow discharge. States she just noticed 2 different spots on her underwear and thought the other spot came from her rectal area. Denies feeling any rectal discharge. Denies any vaginal itching or urinary symptoms. HTN  Last taken BP med around 9 am. States she just had eaten a chicken salad sandwich right before her appt today. States she also feels upset this afternoon re: one of her doctor's appt date.         Lab Results   Component Value Date    WBC 7.5 2022    HGB 13.8 2022    HCT 41.9 2022    MCV 89 2022     2022         Patient Active Problem List   Diagnosis    B12 deficiency    Oculomotor nerve palsy    Mixed hyperlipidemia    Essential hypertension    Prediabetes    Acquired hypothyroidism    Chronic diastolic congestive heart failure (HCC)    Cognitive impairment    Lewy body dementia without behavioral disturbance ([address]    Chronic renal disease, stage III ([address]    Constipation    Gas bloat syndrome    Acute vaginitis    Seasonal allergic rhinitis due to pollen       Past Medical History:   Diagnosis Date    Acquired hypothyroidism 3/21/2022    Arthritis     Chronic diastolic congestive heart failure ([address] 3/21/2022    GERD (gastroesophageal reflux disease)     well controlled with medication    Hypercholesteremia     medication    Hypertension     well controlled with medication    Hypokalemia 3/21/2022    Obesity        Past Surgical History:   Procedure Laterality Date    BREAST REDUCTION SURGERY      COLONOSCOPY  2016,2018    + polyp  repeat 2023    HYSTERECTOMY      OTHER SURGICAL HISTORY      tummy tuck    TOTAL KNEE ARTHROPLASTY Right        Family History   Problem Relation Age of Onset    Diabetes Father     Hypertension Mother     Dementia Mother     Hypertension Father     Parkinson's Disease Paternal Grandmother     Breast Cancer Neg Hx          Allergies   Allergen Reactions    Ace Inhibitors Other (See Comments)     cough    Adhesive Tape Other (See Comments)     Rash,paper tape OK           Current Outpatient Medications on File Prior to Visit   Medication Sig Dispense Refill    metoprolol succinate (TOPROL XL) 50 MG extended release tablet Take 1 tablet by mouth daily TAKE ONE TABLET BY MOUTH EVERY DAY 90 tablet 0    bumetanide (BUMEX) 1 MG tablet Take 1 tablet by mouth daily 90 tablet 0    aspirin 81 MG EC tablet Take 81 mg by mouth daily      atorvastatin (LIPITOR) 80 MG tablet Take 80 mg by mouth daily      candesartan (ATACAND) 32 MG tablet Take 32 mg by mouth daily      levothyroxine (SYNTHROID) 75 MCG tablet Take 75 mcg by mouth every morning (before breakfast)      potassium chloride (KLOR-CON) 10 MEQ extended release tablet Take 10 mEq by mouth daily      vitamin E 1000 units capsule Take 1,000 Units by mouth daily       No current facility-administered medications on file prior to visit.             Review of Systems  Review of Systems   Constitutional: Negative for chills, fatigue and fever. HENT: Negative for congestion, postnasal drip, rhinorrhea, sinus pain, sneezing and sore throat. Eyes: Negative for pain and visual disturbance. Respiratory: Negative for cough and shortness of breath. Cardiovascular: Negative for chest pain, palpitations and leg swelling. Gastrointestinal: Negative for abdominal pain, constipation, diarrhea, nausea and vomiting. Genitourinary: Positive for vaginal discharge. Negative for dysuria, frequency and urgency. Musculoskeletal: Negative for back pain, gait problem and joint swelling. Skin: Negative for rash and wound. Neurological: Negative for dizziness and headaches. Psychiatric/Behavioral: Negative for behavioral problems, sleep disturbance and suicidal ideas. The patient is not nervous/anxious. Vitals:    06/07/22 1509 06/07/22 1540   BP: (!) 168/71 (!) 145/63   Site: Left Upper Arm Right Upper Arm   Position: Sitting Sitting   Cuff Size: Large Adult Large Adult   Pulse: 52    Temp: 97.4 °F (36.3 °C)    TempSrc: Temporal    SpO2: 100%    Weight: 177 lb (80.3 kg)    Height: 5' 4\" (1.626 m)            Physical Exam  Physical Exam  Constitutional:       General: She is not in acute distress. Appearance: She is not ill-appearing. HENT:      Head: Normocephalic and atraumatic. Eyes:      Pupils: Pupils are equal, round, and reactive to light. Cardiovascular:      Rate and Rhythm: Normal rate and regular rhythm. Pulmonary:      Effort: Pulmonary effort is normal. No respiratory distress. Breath sounds: Normal breath sounds. Abdominal:      General: Bowel sounds are normal.      Palpations: Abdomen is soft. Musculoskeletal:         General: Normal range of motion. Cervical back: Neck supple. Skin:     General: Skin is warm and dry. Neurological:      General: No focal deficit present.       Mental Status: She is alert and oriented to person, place, and time. Psychiatric:         Mood and Affect: Mood normal.         Thought Content: Thought content normal.                Assessment/Plan:          ICD-10-CM    1. Acute vaginitis  N76.0 metroNIDAZOLE (FLAGYL) 500 MG tablet     fluconazole (DIFLUCAN) 150 MG tablet          Lizabeth Doss was seen today for vaginal discharge. Diagnoses and all orders for this visit:    Acute vaginitis  -     metroNIDAZOLE (FLAGYL) 500 MG tablet; Take 1 tablet by mouth 2 times daily for 7 days  -     fluconazole (DIFLUCAN) 150 MG tablet; Take 1 tablet by mouth once for 1 dose          Continue other meds and treatment plan by PCP and specialists. Return in about 22 days (around 6/29/2022), or if symptoms worsen or fail to improve, for PCP Dr. Taylor Ruggiero.              Kaylynn Pate, DNP, FNP-BC

## 2022-06-29 ENCOUNTER — OFFICE VISIT (OUTPATIENT)
Dept: INTERNAL MEDICINE CLINIC | Facility: CLINIC | Age: 67
End: 2022-06-29
Payer: MEDICARE

## 2022-06-29 VITALS
TEMPERATURE: 97.2 F | OXYGEN SATURATION: 99 % | DIASTOLIC BLOOD PRESSURE: 75 MMHG | HEIGHT: 64 IN | WEIGHT: 171 LBS | HEART RATE: 78 BPM | SYSTOLIC BLOOD PRESSURE: 136 MMHG | BODY MASS INDEX: 29.19 KG/M2

## 2022-06-29 DIAGNOSIS — N18.31 STAGE 3A CHRONIC KIDNEY DISEASE (HCC): ICD-10-CM

## 2022-06-29 DIAGNOSIS — I50.32 CHRONIC DIASTOLIC CONGESTIVE HEART FAILURE (HCC): ICD-10-CM

## 2022-06-29 DIAGNOSIS — G31.83 LEWY BODY DEMENTIA WITHOUT BEHAVIORAL DISTURBANCE (HCC): ICD-10-CM

## 2022-06-29 DIAGNOSIS — R73.03 PREDIABETES: ICD-10-CM

## 2022-06-29 DIAGNOSIS — E78.2 MIXED HYPERLIPIDEMIA: ICD-10-CM

## 2022-06-29 DIAGNOSIS — F02.80 LEWY BODY DEMENTIA WITHOUT BEHAVIORAL DISTURBANCE (HCC): ICD-10-CM

## 2022-06-29 DIAGNOSIS — I10 ESSENTIAL HYPERTENSION: Primary | ICD-10-CM

## 2022-06-29 DIAGNOSIS — I10 ESSENTIAL HYPERTENSION: ICD-10-CM

## 2022-06-29 DIAGNOSIS — E03.9 ACQUIRED HYPOTHYROIDISM: ICD-10-CM

## 2022-06-29 PROBLEM — N76.0 ACUTE VAGINITIS: Status: RESOLVED | Noted: 2022-05-12 | Resolved: 2022-06-29

## 2022-06-29 PROBLEM — J30.1 SEASONAL ALLERGIC RHINITIS DUE TO POLLEN: Status: RESOLVED | Noted: 2022-05-12 | Resolved: 2022-06-29

## 2022-06-29 PROBLEM — K92.89 GAS BLOAT SYNDROME: Status: RESOLVED | Noted: 2022-05-12 | Resolved: 2022-06-29

## 2022-06-29 PROBLEM — K59.00 CONSTIPATION: Status: RESOLVED | Noted: 2022-05-12 | Resolved: 2022-06-29

## 2022-06-29 PROBLEM — R41.89 COGNITIVE IMPAIRMENT: Status: RESOLVED | Noted: 2022-03-21 | Resolved: 2022-06-29

## 2022-06-29 PROCEDURE — 99214 OFFICE O/P EST MOD 30 MIN: CPT | Performed by: INTERNAL MEDICINE

## 2022-06-29 PROCEDURE — 1123F ACP DISCUSS/DSCN MKR DOCD: CPT | Performed by: INTERNAL MEDICINE

## 2022-06-29 RX ORDER — ESCITALOPRAM OXALATE 10 MG/1
10 TABLET ORAL DAILY
COMMUNITY
End: 2022-06-29

## 2022-06-29 ASSESSMENT — ANXIETY QUESTIONNAIRES
2. NOT BEING ABLE TO STOP OR CONTROL WORRYING: 0
6. BECOMING EASILY ANNOYED OR IRRITABLE: 0
5. BEING SO RESTLESS THAT IT IS HARD TO SIT STILL: 0
GAD7 TOTAL SCORE: 0
IF YOU CHECKED OFF ANY PROBLEMS ON THIS QUESTIONNAIRE, HOW DIFFICULT HAVE THESE PROBLEMS MADE IT FOR YOU TO DO YOUR WORK, TAKE CARE OF THINGS AT HOME, OR GET ALONG WITH OTHER PEOPLE: NOT DIFFICULT AT ALL
3. WORRYING TOO MUCH ABOUT DIFFERENT THINGS: 0
7. FEELING AFRAID AS IF SOMETHING AWFUL MIGHT HAPPEN: 0
4. TROUBLE RELAXING: 0
1. FEELING NERVOUS, ANXIOUS, OR ON EDGE: 0

## 2022-06-29 ASSESSMENT — ENCOUNTER SYMPTOMS
BLOOD IN STOOL: 0
SHORTNESS OF BREATH: 0

## 2022-06-29 ASSESSMENT — PATIENT HEALTH QUESTIONNAIRE - PHQ9
SUM OF ALL RESPONSES TO PHQ QUESTIONS 1-9: 0
SUM OF ALL RESPONSES TO PHQ QUESTIONS 1-9: 0
1. LITTLE INTEREST OR PLEASURE IN DOING THINGS: 0
SUM OF ALL RESPONSES TO PHQ9 QUESTIONS 1 & 2: 0
2. FEELING DOWN, DEPRESSED OR HOPELESS: 0
SUM OF ALL RESPONSES TO PHQ QUESTIONS 1-9: 0
SUM OF ALL RESPONSES TO PHQ QUESTIONS 1-9: 0

## 2022-06-29 NOTE — PATIENT INSTRUCTIONS
I recommend all standard healthcare maintenance and cancer screenings (Colon cancer screening, Mammogram and Bone Density if female and appropriate, etc) and standard immunizations (Flu, Pneumonia, Covid-19, Tetanus boosters, etc) as appropriate and due to lower your risk for potentially preventable morbidity/mortality from these diseases.

## 2022-06-29 NOTE — PROGRESS NOTES
Thelma Mcneil M.D. Internal Medicine  [address], [facility] [address]  Phone: [phone]  Fax: [phone]    Hypertension  This is a chronic problem. The current episode started more than 1 year ago. The problem has been waxing and waning since onset. The problem is controlled. Pertinent negatives include no chest pain or shortness of breath. There are no associated agents to hypertension. Risk factors for coronary artery disease include dyslipidemia, obesity and post-menopausal state. Past treatments include angiotensin blockers, beta blockers and diuretics. The current treatment provides moderate improvement. Kenya Krueger is a 69 y.o. White (non-) female. Current Outpatient Medications   Medication Sig Dispense Refill    metoprolol succinate (TOPROL XL) 50 MG extended release tablet Take 1 tablet by mouth daily TAKE ONE TABLET BY MOUTH EVERY DAY 90 tablet 0    bumetanide (BUMEX) 1 MG tablet Take 1 tablet by mouth daily 90 tablet 0    aspirin 81 MG EC tablet Take 81 mg by mouth daily      atorvastatin (LIPITOR) 80 MG tablet Take 80 mg by mouth daily      candesartan (ATACAND) 32 MG tablet Take 32 mg by mouth daily      levothyroxine (SYNTHROID) 75 MCG tablet Take 75 mcg by mouth every morning (before breakfast)      potassium chloride (KLOR-CON) 10 MEQ extended release tablet Take 10 mEq by mouth daily      vitamin E 1000 units capsule Take 1,000 Units by mouth daily       No current facility-administered medications for this visit.      Allergies   Allergen Reactions    Ace Inhibitors Other (See Comments)     cough    Adhesive Tape Other (See Comments)     Rash,paper tape OK    Sulfa Antibiotics      Other reaction(s): Unknown     Past Medical History:   Diagnosis Date    Acquired hypothyroidism 3/21/2022    Arthritis     Chronic diastolic congestive heart failure ([facility] 3/21/2022    GERD (gastroesophageal reflux disease)     well controlled with medication    Hypercholesteremia     medication    Hypertension     well controlled with medication    Hypokalemia 3/21/2022    Obesity      Past Surgical History:   Procedure Laterality Date    BREAST REDUCTION SURGERY      COLONOSCOPY  8538,0370    + polyp  repeat 2023    HYSTERECTOMY (CERVIX STATUS UNKNOWN)      OTHER SURGICAL HISTORY      tummy tuck    TOTAL KNEE ARTHROPLASTY Right      Social History     Tobacco Use    Smoking status: Never Smoker    Smokeless tobacco: Never Used   Substance Use Topics    Alcohol use: Yes    Drug use: No     Family History   Problem Relation Age of Onset    Diabetes Father     Hypertension Mother     Dementia Mother     Hypertension Father     Parkinson's Disease Paternal Grandmother     Breast Cancer Neg Hx       Review of Systems   Respiratory: Negative for shortness of breath. Cardiovascular: Negative for chest pain. Gastrointestinal: Negative for blood in stool. Physical Exam  Vitals and nursing note reviewed. Constitutional:       General: She is not in acute distress. Appearance: Normal appearance. She is obese. She is not ill-appearing, toxic-appearing or diaphoretic. HENT:      Head: Normocephalic and atraumatic. Right Ear: External ear normal.      Left Ear: External ear normal.   Eyes:      General: No scleral icterus. Right eye: No discharge. Left eye: No discharge. Conjunctiva/sclera: Conjunctivae normal.   Cardiovascular:      Rate and Rhythm: Normal rate and regular rhythm. Heart sounds: Normal heart sounds. No murmur heard. No friction rub. No gallop. Pulmonary:      Effort: Pulmonary effort is normal. No respiratory distress. Breath sounds: Normal breath sounds. No stridor. No wheezing, rhonchi or rales. Abdominal:      General: Abdomen is flat. Bowel sounds are normal. There is no distension. Palpations: Abdomen is soft. There is no mass. Tenderness:  There is no abdominal tenderness. There is no guarding or rebound. Musculoskeletal:      Right lower leg: No edema. Left lower leg: No edema. Skin:     General: Skin is warm and dry. Coloration: Skin is not jaundiced or pale. Findings: No erythema. Neurological:      General: No focal deficit present. Mental Status: She is alert and oriented to person, place, and time. Mental status is at baseline. Psychiatric:         Mood and Affect: Mood normal.         Behavior: Behavior normal.         Thought Content: Thought content normal.         Judgment: Judgment normal.     I have reviewed/discussed the above normal BMI with the patient. I have recommended the following interventions: dietary management education, guidance, and counseling and encourage exercise . ASSESSMENT AND PLAN:     · HTN: Taking current regimen (Atacand 32, Bumex 1 QDay, Toprol XL 50) w/o problems. Home BPs = 130s/70s. · HLD: Taking current regimen (Lipitor 80) w/o problems. · FLPs:   1. 3 on Lipitor 80 = [144/68/43/198]. · Hypothyroidism: Taking current regimen (Synthroid 75) w/o problems. · TSHs:  1. 3 on Synthroid 75 = 0.865. · Prediabetes:   · A1C's:  1. 1 =6.2.  2. 3/31/22 = 6.0.   · CKD3: Baseline Cr ? 1-1.6 and stable (Since 3/2022). · Diastolic CHF: Follows with Cardiology (Dr. Pat Head at [facility]). · Hypokalemia: Taking current regimen (PO KCl 10 QDay). · B12 Def:   1. 3/21/22 = 653. · Cognitive Impairment: Diagnosed with Lewy Body Dementia. Follows with Neurology (At [facility]). · HCM:   · CRC Screenin18 by Dr. Colin Tracey = IH/EH; 1 Fernando (HP); 5 Year Repeat. · Mammogram: 22 = Neg.   · Td/Shingrix: 22 advised to do at her pharmacy. · Flu:   · F/u: 3 Months. Non-fasting labs at that visit. Robert Meyer was seen today for hypertension and cholesterol problem.     Diagnoses and all orders for this visit:    Essential hypertension  -     Basic Metabolic Panel; Future  -     CBC with Auto Differential; Future  -     Hepatic Function Panel; Future    Mixed hyperlipidemia  -     Basic Metabolic Panel; Future  -     CBC with Auto Differential; Future  -     Hepatic Function Panel; Future    Acquired hypothyroidism    Prediabetes  -     Basic Metabolic Panel; Future    Chronic diastolic congestive heart failure ([facility]  -     Basic Metabolic Panel; Future  -     CBC with Auto Differential; Future  -     Hepatic Function Panel; Future    Lewy body dementia without behavioral disturbance ([facility]  -     Basic Metabolic Panel; Future  -     CBC with Auto Differential; Future  -     Hepatic Function Panel; Future    Stage 3a chronic kidney disease ([facility]  -     Basic Metabolic Panel; Future  -     CBC with Auto Differential; Future  -     Hepatic Function Panel;  Future

## 2022-06-30 LAB
ALBUMIN SERPL-MCNC: 4.2 G/DL (ref 3.2–4.6)
ALBUMIN/GLOB SERPL: 1.3 {RATIO} (ref 1.2–3.5)
ALP SERPL-CCNC: 93 U/L (ref 50–136)
ALT SERPL-CCNC: 41 U/L (ref 12–65)
ANION GAP SERPL CALC-SCNC: 9 MMOL/L (ref 7–16)
AST SERPL-CCNC: 29 U/L (ref 15–37)
BASOPHILS # BLD: 0 K/UL (ref 0–0.2)
BASOPHILS NFR BLD: 1 % (ref 0–2)
BILIRUB DIRECT SERPL-MCNC: 0.1 MG/DL
BILIRUB SERPL-MCNC: 0.5 MG/DL (ref 0.2–1.1)
BUN SERPL-MCNC: 39 MG/DL (ref 8–23)
CALCIUM SERPL-MCNC: 10.5 MG/DL (ref 8.3–10.4)
CHLORIDE SERPL-SCNC: 108 MMOL/L (ref 98–107)
CO2 SERPL-SCNC: 24 MMOL/L (ref 21–32)
CREAT SERPL-MCNC: 1.4 MG/DL (ref 0.6–1)
DIFFERENTIAL METHOD BLD: NORMAL
EOSINOPHIL # BLD: 0.1 K/UL (ref 0–0.8)
EOSINOPHIL NFR BLD: 1 % (ref 0.5–7.8)
ERYTHROCYTE [DISTWIDTH] IN BLOOD BY AUTOMATED COUNT: 14.1 % (ref 11.9–14.6)
GLOBULIN SER CALC-MCNC: 3.3 G/DL (ref 2.3–3.5)
GLUCOSE SERPL-MCNC: 109 MG/DL (ref 65–100)
HCT VFR BLD AUTO: 43.4 % (ref 35.8–46.3)
HGB BLD-MCNC: 14 G/DL (ref 11.7–15.4)
IMM GRANULOCYTES # BLD AUTO: 0 K/UL (ref 0–0.5)
IMM GRANULOCYTES NFR BLD AUTO: 0 % (ref 0–5)
LYMPHOCYTES # BLD: 2.8 K/UL (ref 0.5–4.6)
LYMPHOCYTES NFR BLD: 41 % (ref 13–44)
MCH RBC QN AUTO: 29.4 PG (ref 26.1–32.9)
MCHC RBC AUTO-ENTMCNC: 32.3 G/DL (ref 31.4–35)
MCV RBC AUTO: 91.2 FL (ref 79.6–97.8)
MONOCYTES # BLD: 0.6 K/UL (ref 0.1–1.3)
MONOCYTES NFR BLD: 9 % (ref 4–12)
NEUTS SEG # BLD: 3.4 K/UL (ref 1.7–8.2)
NEUTS SEG NFR BLD: 48 % (ref 43–78)
NRBC # BLD: 0 K/UL (ref 0–0.2)
PLATELET # BLD AUTO: 286 K/UL (ref 150–450)
PMV BLD AUTO: 10.5 FL (ref 9.4–12.3)
POTASSIUM SERPL-SCNC: 4.7 MMOL/L (ref 3.5–5.1)
PROT SERPL-MCNC: 7.5 G/DL (ref 6.3–8.2)
RBC # BLD AUTO: 4.76 M/UL (ref 4.05–5.2)
SODIUM SERPL-SCNC: 141 MMOL/L (ref 136–145)
WBC # BLD AUTO: 7 K/UL (ref 4.3–11.1)

## 2022-08-22 RX ORDER — BUMETANIDE 1 MG/1
1 TABLET ORAL DAILY
Qty: 90 TABLET | Refills: 0 | Status: SHIPPED | OUTPATIENT
Start: 2022-08-22 | End: 2022-10-05 | Stop reason: SDUPTHER

## 2022-08-22 NOTE — TELEPHONE ENCOUNTER
Pt was last seen 6/29/2022 and has a up coming appointment 10/5/2022.  Last Rx was sent in may for 90/0

## 2022-09-07 RX ORDER — POTASSIUM CHLORIDE 750 MG/1
10 TABLET, FILM COATED, EXTENDED RELEASE ORAL DAILY
Qty: 30 TABLET | Refills: 0 | Status: CANCELLED | OUTPATIENT
Start: 2022-09-07

## 2022-09-07 RX ORDER — LEVOTHYROXINE SODIUM 0.07 MG/1
75 TABLET ORAL DAILY
Qty: 30 TABLET | Refills: 0 | Status: CANCELLED | OUTPATIENT
Start: 2022-09-07

## 2022-09-07 RX ORDER — METOPROLOL SUCCINATE 50 MG/1
50 TABLET, EXTENDED RELEASE ORAL DAILY
Qty: 30 TABLET | Refills: 0 | Status: SHIPPED | OUTPATIENT
Start: 2022-09-07 | End: 2022-10-05 | Stop reason: SDUPTHER

## 2022-09-07 RX ORDER — BUMETANIDE 1 MG/1
1 TABLET ORAL DAILY
Qty: 90 TABLET | Refills: 0 | Status: CANCELLED | OUTPATIENT
Start: 2022-09-07

## 2022-09-07 RX ORDER — POTASSIUM CHLORIDE 750 MG/1
10 TABLET, FILM COATED, EXTENDED RELEASE ORAL DAILY
Qty: 30 TABLET | Refills: 0 | Status: SHIPPED | OUTPATIENT
Start: 2022-09-07 | End: 2022-10-05 | Stop reason: SDUPTHER

## 2022-09-07 RX ORDER — LEVOTHYROXINE SODIUM 0.07 MG/1
75 TABLET ORAL DAILY
Qty: 30 TABLET | Refills: 0 | Status: SHIPPED | OUTPATIENT
Start: 2022-09-07 | End: 2022-10-05 | Stop reason: SDUPTHER

## 2022-09-12 RX ORDER — POTASSIUM CHLORIDE 750 MG/1
10 TABLET, FILM COATED, EXTENDED RELEASE ORAL DAILY
Qty: 30 TABLET | Refills: 0 | OUTPATIENT
Start: 2022-09-12

## 2022-09-12 RX ORDER — METOPROLOL SUCCINATE 50 MG/1
50 TABLET, EXTENDED RELEASE ORAL DAILY
Qty: 30 TABLET | Refills: 0 | OUTPATIENT
Start: 2022-09-12

## 2022-09-12 RX ORDER — BUMETANIDE 1 MG/1
1 TABLET ORAL DAILY
Qty: 90 TABLET | Refills: 0 | OUTPATIENT
Start: 2022-09-12

## 2022-09-20 NOTE — TELEPHONE ENCOUNTER
Patient's  called concerning the patients med refills. He states that he submitted them a couple of weeks ago, and he hasn't heard anything from anyone. Patient has been out of medication for 3 or 4 days. Please contact and advise patient.

## 2022-09-22 RX ORDER — CANDESARTAN 32 MG/1
32 TABLET ORAL DAILY
Qty: 90 TABLET | Refills: 0 | Status: SHIPPED | OUTPATIENT
Start: 2022-09-22 | End: 2022-10-05 | Stop reason: SDUPTHER

## 2022-10-05 ENCOUNTER — OFFICE VISIT (OUTPATIENT)
Dept: INTERNAL MEDICINE CLINIC | Facility: CLINIC | Age: 67
End: 2022-10-05
Payer: MEDICARE

## 2022-10-05 VITALS
TEMPERATURE: 97.2 F | DIASTOLIC BLOOD PRESSURE: 81 MMHG | HEART RATE: 66 BPM | OXYGEN SATURATION: 99 % | BODY MASS INDEX: 29.02 KG/M2 | WEIGHT: 170 LBS | HEIGHT: 64 IN | SYSTOLIC BLOOD PRESSURE: 133 MMHG

## 2022-10-05 DIAGNOSIS — E87.6 HYPOKALEMIA: ICD-10-CM

## 2022-10-05 DIAGNOSIS — N18.31 STAGE 3A CHRONIC KIDNEY DISEASE (HCC): ICD-10-CM

## 2022-10-05 DIAGNOSIS — E78.2 MIXED HYPERLIPIDEMIA: ICD-10-CM

## 2022-10-05 DIAGNOSIS — K59.00 CONSTIPATION, UNSPECIFIED CONSTIPATION TYPE: ICD-10-CM

## 2022-10-05 DIAGNOSIS — E03.9 ACQUIRED HYPOTHYROIDISM: ICD-10-CM

## 2022-10-05 DIAGNOSIS — I10 ESSENTIAL HYPERTENSION: ICD-10-CM

## 2022-10-05 DIAGNOSIS — R73.03 PREDIABETES: ICD-10-CM

## 2022-10-05 DIAGNOSIS — Z00.00 MEDICARE ANNUAL WELLNESS VISIT, SUBSEQUENT: Primary | ICD-10-CM

## 2022-10-05 DIAGNOSIS — G31.83 LEWY BODY DEMENTIA WITHOUT BEHAVIORAL DISTURBANCE (HCC): ICD-10-CM

## 2022-10-05 DIAGNOSIS — M54.50 CHRONIC BILATERAL LOW BACK PAIN WITHOUT SCIATICA: ICD-10-CM

## 2022-10-05 DIAGNOSIS — G89.29 CHRONIC BILATERAL LOW BACK PAIN WITHOUT SCIATICA: ICD-10-CM

## 2022-10-05 DIAGNOSIS — I50.32 CHRONIC DIASTOLIC CONGESTIVE HEART FAILURE (HCC): ICD-10-CM

## 2022-10-05 DIAGNOSIS — F02.80 LEWY BODY DEMENTIA WITHOUT BEHAVIORAL DISTURBANCE (HCC): ICD-10-CM

## 2022-10-05 LAB
ALBUMIN SERPL-MCNC: 4.2 G/DL (ref 3.2–4.6)
ALBUMIN/GLOB SERPL: 1.4 {RATIO} (ref 1.2–3.5)
ALP SERPL-CCNC: 69 U/L (ref 50–136)
ALT SERPL-CCNC: 34 U/L (ref 12–65)
ANION GAP SERPL CALC-SCNC: 5 MMOL/L (ref 4–13)
AST SERPL-CCNC: 17 U/L (ref 15–37)
BASOPHILS # BLD: 0 K/UL (ref 0–0.2)
BASOPHILS NFR BLD: 0 % (ref 0–2)
BILIRUB DIRECT SERPL-MCNC: 0.2 MG/DL
BILIRUB SERPL-MCNC: 0.6 MG/DL (ref 0.2–1.1)
BUN SERPL-MCNC: 41 MG/DL (ref 8–23)
CALCIUM SERPL-MCNC: 10.2 MG/DL (ref 8.3–10.4)
CHLORIDE SERPL-SCNC: 109 MMOL/L (ref 101–110)
CO2 SERPL-SCNC: 26 MMOL/L (ref 21–32)
CREAT SERPL-MCNC: 1.2 MG/DL (ref 0.6–1)
DIFFERENTIAL METHOD BLD: ABNORMAL
EOSINOPHIL # BLD: 0.1 K/UL (ref 0–0.8)
EOSINOPHIL NFR BLD: 1 % (ref 0.5–7.8)
ERYTHROCYTE [DISTWIDTH] IN BLOOD BY AUTOMATED COUNT: 14.7 % (ref 11.9–14.6)
EST. AVERAGE GLUCOSE BLD GHB EST-MCNC: 114 MG/DL
GLOBULIN SER CALC-MCNC: 3 G/DL (ref 2.3–3.5)
GLUCOSE SERPL-MCNC: 102 MG/DL (ref 65–100)
HBA1C MFR BLD: 5.6 % (ref 4.8–5.6)
HCT VFR BLD AUTO: 45.4 % (ref 35.8–46.3)
HGB BLD-MCNC: 14.1 G/DL (ref 11.7–15.4)
IMM GRANULOCYTES # BLD AUTO: 0 K/UL (ref 0–0.5)
IMM GRANULOCYTES NFR BLD AUTO: 0 % (ref 0–5)
LYMPHOCYTES # BLD: 2.4 K/UL (ref 0.5–4.6)
LYMPHOCYTES NFR BLD: 35 % (ref 13–44)
MCH RBC QN AUTO: 29.4 PG (ref 26.1–32.9)
MCHC RBC AUTO-ENTMCNC: 31.1 G/DL (ref 31.4–35)
MCV RBC AUTO: 94.6 FL (ref 79.6–97.8)
MONOCYTES # BLD: 0.5 K/UL (ref 0.1–1.3)
MONOCYTES NFR BLD: 7 % (ref 4–12)
NEUTS SEG # BLD: 3.9 K/UL (ref 1.7–8.2)
NEUTS SEG NFR BLD: 56 % (ref 43–78)
NRBC # BLD: 0 K/UL (ref 0–0.2)
PLATELET # BLD AUTO: 281 K/UL (ref 150–450)
PMV BLD AUTO: 9.9 FL (ref 9.4–12.3)
POTASSIUM SERPL-SCNC: 4.7 MMOL/L (ref 3.5–5.1)
PROT SERPL-MCNC: 7.2 G/DL (ref 6.3–8.2)
RBC # BLD AUTO: 4.8 M/UL (ref 4.05–5.2)
SODIUM SERPL-SCNC: 140 MMOL/L (ref 136–145)
TSH, 3RD GENERATION: 0.94 UIU/ML (ref 0.36–3.74)
WBC # BLD AUTO: 7 K/UL (ref 4.3–11.1)

## 2022-10-05 PROCEDURE — G0439 PPPS, SUBSEQ VISIT: HCPCS | Performed by: INTERNAL MEDICINE

## 2022-10-05 PROCEDURE — 99214 OFFICE O/P EST MOD 30 MIN: CPT | Performed by: INTERNAL MEDICINE

## 2022-10-05 PROCEDURE — 1123F ACP DISCUSS/DSCN MKR DOCD: CPT | Performed by: INTERNAL MEDICINE

## 2022-10-05 RX ORDER — CANDESARTAN 32 MG/1
32 TABLET ORAL DAILY
Qty: 90 TABLET | Refills: 1 | Status: SHIPPED | OUTPATIENT
Start: 2022-10-05

## 2022-10-05 RX ORDER — POTASSIUM CHLORIDE 750 MG/1
10 TABLET, FILM COATED, EXTENDED RELEASE ORAL DAILY
Qty: 90 TABLET | Refills: 1 | Status: SHIPPED | OUTPATIENT
Start: 2022-10-05

## 2022-10-05 RX ORDER — DONEPEZIL HYDROCHLORIDE 10 MG/1
TABLET, FILM COATED ORAL
COMMUNITY
Start: 2022-09-12 | End: 2022-10-17 | Stop reason: SDUPTHER

## 2022-10-05 RX ORDER — METOPROLOL SUCCINATE 50 MG/1
50 TABLET, EXTENDED RELEASE ORAL DAILY
Qty: 90 TABLET | Refills: 1 | Status: SHIPPED | OUTPATIENT
Start: 2022-10-05

## 2022-10-05 RX ORDER — LEVOTHYROXINE SODIUM 0.07 MG/1
75 TABLET ORAL DAILY
Qty: 90 TABLET | Refills: 1 | Status: SHIPPED | OUTPATIENT
Start: 2022-10-05

## 2022-10-05 RX ORDER — ATORVASTATIN CALCIUM 40 MG/1
TABLET, FILM COATED ORAL
COMMUNITY
Start: 2022-09-12

## 2022-10-05 RX ORDER — BUMETANIDE 1 MG/1
1 TABLET ORAL DAILY
Qty: 90 TABLET | Refills: 1 | Status: SHIPPED | OUTPATIENT
Start: 2022-10-05

## 2022-10-05 ASSESSMENT — LIFESTYLE VARIABLES
HOW MANY STANDARD DRINKS CONTAINING ALCOHOL DO YOU HAVE ON A TYPICAL DAY: PATIENT DOES NOT DRINK
HOW OFTEN DO YOU HAVE A DRINK CONTAINING ALCOHOL: NEVER

## 2022-10-05 ASSESSMENT — ANXIETY QUESTIONNAIRES
7. FEELING AFRAID AS IF SOMETHING AWFUL MIGHT HAPPEN: 0
5. BEING SO RESTLESS THAT IT IS HARD TO SIT STILL: 0
IF YOU CHECKED OFF ANY PROBLEMS ON THIS QUESTIONNAIRE, HOW DIFFICULT HAVE THESE PROBLEMS MADE IT FOR YOU TO DO YOUR WORK, TAKE CARE OF THINGS AT HOME, OR GET ALONG WITH OTHER PEOPLE: NOT DIFFICULT AT ALL
6. BECOMING EASILY ANNOYED OR IRRITABLE: 0
1. FEELING NERVOUS, ANXIOUS, OR ON EDGE: 0
4. TROUBLE RELAXING: 0
3. WORRYING TOO MUCH ABOUT DIFFERENT THINGS: 0
GAD7 TOTAL SCORE: 0
2. NOT BEING ABLE TO STOP OR CONTROL WORRYING: 0

## 2022-10-05 ASSESSMENT — PATIENT HEALTH QUESTIONNAIRE - PHQ9
SUM OF ALL RESPONSES TO PHQ QUESTIONS 1-9: 0
2. FEELING DOWN, DEPRESSED OR HOPELESS: 0
SUM OF ALL RESPONSES TO PHQ QUESTIONS 1-9: 0
SUM OF ALL RESPONSES TO PHQ9 QUESTIONS 1 & 2: 0
SUM OF ALL RESPONSES TO PHQ QUESTIONS 1-9: 0
1. LITTLE INTEREST OR PLEASURE IN DOING THINGS: 0
SUM OF ALL RESPONSES TO PHQ QUESTIONS 1-9: 0
SUM OF ALL RESPONSES TO PHQ QUESTIONS 1-9: 0
2. FEELING DOWN, DEPRESSED OR HOPELESS: 0
1. LITTLE INTEREST OR PLEASURE IN DOING THINGS: 0
SUM OF ALL RESPONSES TO PHQ QUESTIONS 1-9: 0
SUM OF ALL RESPONSES TO PHQ9 QUESTIONS 1 & 2: 0

## 2022-10-05 ASSESSMENT — ENCOUNTER SYMPTOMS
SHORTNESS OF BREATH: 0
BLOOD IN STOOL: 0
CONSTIPATION: 1

## 2022-10-05 NOTE — PATIENT INSTRUCTIONS
I recommend all standard healthcare maintenance and cancer screenings (Colon cancer screening, Mammogram and Bone Density if female and appropriate, etc) and standard immunizations (Flu, Pneumonia, Covid-19, Tetanus boosters, etc) as appropriate and due to lower your risk for potentially preventable morbidity/mortality from these diseases. Personalized Preventive Plan for Alonzo Billy - 10/5/2022  Medicare offers a range of preventive health benefits. Some of the tests and screenings are paid in full while other may be subject to a deductible, co-insurance, and/or copay. Some of these benefits include a comprehensive review of your medical history including lifestyle, illnesses that may run in your family, and various assessments and screenings as appropriate. After reviewing your medical record and screening and assessments performed today your provider may have ordered immunizations, labs, imaging, and/or referrals for you. A list of these orders (if applicable) as well as your Preventive Care list are included within your After Visit Summary for your review. Other Preventive Recommendations:    A preventive eye exam performed by an eye specialist is recommended every 1-2 years to screen for glaucoma; cataracts, macular degeneration, and other eye disorders. A preventive dental visit is recommended every 6 months. Try to get at least 150 minutes of exercise per week or 10,000 steps per day on a pedometer . Order or download the FREE \"Exercise & Physical Activity: Your Everyday Guide\" from [employer]. Call 6-332.492.4667 or search [employer] online. You need 7052-0298 mg of calcium and 2010-8204 IU of vitamin D per day.  It is possible to meet your calcium requirement with diet alone, but a vitamin D supplement is usually necessary to meet this goal.  When exposed to the sun, use a sunscreen that protects against both UVA and UVB radiation with an SPF of 30 or greater. Reapply every 2 to 3 hours or after sweating, drying off with a towel, or swimming. Always wear a seat belt when traveling in a car. Always wear a helmet when riding a bicycle or motorcycle.

## 2022-10-05 NOTE — PROGRESS NOTES
John Headley M.D. Internal Medicine  [address], [facility] [address]  Phone: [phone]  Fax: [phone]    Hypertension  This is a chronic problem. The current episode started more than 1 year ago. The problem has been waxing and waning since onset. The problem is controlled. Pertinent negatives include no chest pain or shortness of breath. There are no associated agents to hypertension. Risk factors for coronary artery disease include dyslipidemia, obesity and post-menopausal state. Past treatments include angiotensin blockers, beta blockers and diuretics. The current treatment provides moderate improvement. Constipation  This is a new problem. The current episode started more than 1 month ago. The problem is unchanged. Her stool frequency is 2 to 3 times per week. The stool is described as firm. The patient is not on a high fiber diet. She Exercises regularly. There has Not been adequate water intake. She has tried stool softeners for the symptoms. The treatment provided mild relief. Toño Mejía is a 69 y.o. White (non-) female. Current Outpatient Medications   Medication Sig Dispense Refill    donepezil (ARICEPT) 10 MG tablet       atorvastatin (LIPITOR) 40 MG tablet       candesartan (ATACAND) 32 MG tablet Take 1 tablet by mouth daily 90 tablet 0    levothyroxine (SYNTHROID) 75 MCG tablet Take 1 tablet by mouth Daily 30 tablet 0    potassium chloride (KLOR-CON) 10 MEQ extended release tablet Take 1 tablet by mouth daily 30 tablet 0    metoprolol succinate (TOPROL XL) 50 MG extended release tablet Take 1 tablet by mouth daily TAKE ONE TABLET BY MOUTH EVERY DAY 30 tablet 0    bumetanide (BUMEX) 1 MG tablet TAKE 1 TABLET BY MOUTH DAILY.  90 tablet 0    docusate (COLACE) 50 MG/5ML liquid Take 50 mg by mouth daily      atorvastatin (LIPITOR) 80 MG tablet Take 80 mg by mouth daily      vitamin E 1000 units capsule Take 1,000 Units by mouth daily aspirin 81 MG EC tablet Take 81 mg by mouth daily       No current facility-administered medications for this visit. Allergies   Allergen Reactions    Ace Inhibitors Other (See Comments)     cough    Adhesive Tape Other (See Comments)     Rash,paper tape OK    Sulfa Antibiotics      Other reaction(s): Unknown     Past Medical History:   Diagnosis Date    Acquired hypothyroidism 3/21/2022    Arthritis     Chronic diastolic congestive heart failure ([address] 3/21/2022    Chronic renal disease, stage III (HCC)     GERD (gastroesophageal reflux disease)     well controlled with medication    Hypercholesteremia     medication    Hypertension     well controlled with medication    Hypokalemia 3/21/2022    Obesity      Past Surgical History:   Procedure Laterality Date    BREAST REDUCTION SURGERY      COLONOSCOPY  2016,2018    + polyp  repeat 2023    HYSTERECTOMY (CERVIX STATUS UNKNOWN)      OTHER SURGICAL HISTORY      tummy tuck    TOTAL KNEE ARTHROPLASTY Right      Social History     Tobacco Use    Smoking status: Never    Smokeless tobacco: Never   Substance Use Topics    Alcohol use: Yes    Drug use: No     Family History   Problem Relation Age of Onset    Diabetes Father     Hypertension Mother     Dementia Mother     Hypertension Father     Parkinson's Disease Paternal Grandmother     Breast Cancer Neg Hx       Review of Systems   Respiratory:  Negative for shortness of breath. Cardiovascular:  Negative for chest pain. Gastrointestinal:  Positive for constipation. Negative for blood in stool. Physical Exam  Vitals and nursing note reviewed. Constitutional:       General: She is not in acute distress. Appearance: Normal appearance. She is not ill-appearing, toxic-appearing or diaphoretic. HENT:      Head: Normocephalic and atraumatic. Right Ear: External ear normal.      Left Ear: External ear normal.   Eyes:      General: No scleral icterus. Right eye: No discharge.          Left eye: No discharge. Conjunctiva/sclera: Conjunctivae normal.   Cardiovascular:      Rate and Rhythm: Regular rhythm. Heart sounds: Normal heart sounds. No murmur heard. No friction rub. No gallop. Pulmonary:      Effort: Pulmonary effort is normal. No respiratory distress. Breath sounds: Normal breath sounds. No stridor. No wheezing, rhonchi or rales. Abdominal:      General: Abdomen is flat. Bowel sounds are normal. There is no distension. Palpations: Abdomen is soft. There is no mass. Tenderness: There is no abdominal tenderness. There is no guarding or rebound. Musculoskeletal:      Right lower leg: No edema. Left lower leg: No edema. Skin:     General: Skin is warm and dry. Coloration: Skin is not jaundiced or pale. Findings: No erythema. Neurological:      General: No focal deficit present. Mental Status: She is alert and oriented to person, place, and time. Mental status is at baseline. Psychiatric:         Mood and Affect: Mood normal.         Behavior: Behavior normal.         Thought Content: Thought content normal.         Judgment: Judgment normal.   I have reviewed/discussed the above normal BMI with the patient. I have recommended the following interventions: dietary management education, guidance, and counseling and encourage exercise . ASSESSMENT AND PLAN:    Constipation (Undiagnosed new problem with uncertain prognosis): Needs labs. Increase dietary fiber/water. To GI for colo if not better. Low Back Pain: Check labs. Ortho eval.   HTN: Taking current regimen (Atacand 32, Bumex 1 QDay, Toprol XL 50) w/o problems. Home BPs = 130s/70s. Well controlled. Continue current regimen. HLD: Taking current regimen (Lipitor 80) w/o problems. Well controlled. Continue current regimen. FLPs:   3/21/22 on Lipitor 80 = [144/68/43/198]. Hypothyroidism: Taking current regimen (Synthroid 75) w/o problems. Well controlled.   Continue current regimen. TSHs:  3/21/22 on Synthroid 75 = 0.865.   10/5/22 on Synthroid 75 =   Prediabetes:   A1C's:  16 = 6.2.  3/31/22 = 6.0.   10/5/22 =   CKD3: Baseline Cr ? 1-1.6 and stable (Since 3/2022). Diastolic CHF: Follows with Cardiology (Dr. Jennifer Pappas at [facility]). Hypokalemia: Taking current regimen (PO KCl 10 QDay) w/o problems  Cognitive Impairment: Diagnosed with Lewy Body Dementia. Follows with Neurology (At [facility]). HCM:   CRC Screenin18 by Dr. Tessa Garcia = IH/EH; 1 Fernando (HP); 5 Year Repeat. Mammogram: 22 = Neg.   Flu: Recommend yearly flu vaccine to lower risk for preventable morbidity/mortality from the flu. F/u: 3 Months. Non-fasting labs at that visit. Pepe Mendes was seen today for hypertension, cholesterol problem, medicare awv and constipation. Diagnoses and all orders for this visit:    Medicare annual wellness visit, subsequent    Constipation, unspecified constipation type  -     Basic Metabolic Panel; Future  -     CBC with Auto Differential; Future  -     Hepatic Function Panel; Future  -     TSH; Future    Chronic bilateral low back pain without sciatica  -     Basic Metabolic Panel; Future  -     CBC with Auto Differential; Future  -     Hepatic Function Panel; Future  -     [id number] Arianna Mejia, [employer]    Essential hypertension  -     bumetanide (BUMEX) 1 MG tablet; Take 1 tablet by mouth daily  -     candesartan (ATACAND) 32 MG tablet; Take 1 tablet by mouth daily  -     Basic Metabolic Panel; Future  -     CBC with Auto Differential; Future  -     Hepatic Function Panel; Future  -     TSH; Future    Mixed hyperlipidemia  -     Basic Metabolic Panel; Future  -     CBC with Auto Differential; Future  -     Hepatic Function Panel; Future  -     TSH; Future    Acquired hypothyroidism  -     levothyroxine (SYNTHROID) 75 MCG tablet; Take 1 tablet by mouth Daily  -     metoprolol succinate (TOPROL XL) 50 MG extended release tablet;  Take 1 tablet by mouth daily  -     TSH; Future    Prediabetes  -     Basic Metabolic Panel; Future  -     Hemoglobin A1C; Future    Stage 3a chronic kidney disease ([facility] [address]  -     Basic Metabolic Panel; Future  -     CBC with Auto Differential; Future  -     Hepatic Function Panel; Future  -     TSH; Future    Chronic diastolic congestive heart failure ([facility] [address]  -     Basic Metabolic Panel; Future  -     CBC with Auto Differential; Future  -     Hepatic Function Panel; Future  -     TSH; Future    Lewy body dementia without behavioral disturbance ([email or web address]  -     Basic Metabolic Panel; Future  -     CBC with Auto Differential; Future  -     Hepatic Function Panel; Future  -     TSH; Future    Hypokalemia  -     potassium chloride (KLOR-CON) 10 MEQ extended release tablet; Take 1 tablet by mouth daily  -     Basic Metabolic Panel; Future   Medicare Annual Wellness Visit    Chante Sims is here for Hypertension, Cholesterol Problem, Medicare AWV, and Constipation (/)    Assessment & Plan   Medicare annual wellness visit, subsequent  Constipation, unspecified constipation type  -     Basic Metabolic Panel; Future  -     CBC with Auto Differential; Future  -     Hepatic Function Panel; Future  -     TSH; Future  Chronic bilateral low back pain without sciatica  -     Basic Metabolic Panel; Future  -     CBC with Auto Differential; Future  -     Hepatic Function Panel; Future  -     [id number] Arianna Mejia, [facility]  Essential hypertension  -     bumetanide (BUMEX) 1 MG tablet; Take 1 tablet by mouth daily, Disp-90 tablet, R-1Normal  -     candesartan (ATACAND) 32 MG tablet; Take 1 tablet by mouth daily, Disp-90 tablet, R-1Normal  -     Basic Metabolic Panel; Future  -     CBC with Auto Differential; Future  -     Hepatic Function Panel; Future  -     TSH; Future  Mixed hyperlipidemia  -     Basic Metabolic Panel; Future  -     CBC with Auto Differential; Future  -     Hepatic Function Panel;  Future  - TSH; Future  Acquired hypothyroidism  -     levothyroxine (SYNTHROID) 75 MCG tablet; Take 1 tablet by mouth Daily, Disp-90 tablet, R-1Normal  -     metoprolol succinate (TOPROL XL) 50 MG extended release tablet; Take 1 tablet by mouth daily, Disp-90 tablet, R-1Normal  -     TSH; Future  Prediabetes  -     Basic Metabolic Panel; Future  -     Hemoglobin A1C; Future  Stage 3a chronic kidney disease ([facility] [address]  -     Basic Metabolic Panel; Future  -     CBC with Auto Differential; Future  -     Hepatic Function Panel; Future  -     TSH; Future  Chronic diastolic congestive heart failure ([facility] [address]  -     Basic Metabolic Panel; Future  -     CBC with Auto Differential; Future  -     Hepatic Function Panel; Future  -     TSH; Future  [facility]  -     Basic Metabolic Panel; Future  -     CBC with Auto Differential; Future  -     Hepatic Function Panel; Future  -     TSH; Future  Hypokalemia  -     potassium chloride (KLOR-CON) 10 MEQ extended release tablet; Take 1 tablet by mouth daily, Disp-90 tablet, R-1Normal  -     Basic Metabolic Panel; Future    Recommendations for Preventive Services Due: see orders and patient instructions/AVS.  Recommended screening schedule for the next 5-10 years is provided to the patient in written form: see Patient Instructions/AVS.     Return for Medicare Annual Wellness Visit in 1 year. Subjective   The following acute and/or chronic problems were also addressed today:  Constipation and low back pain. Patient's complete Health Risk Assessment and screening values have been reviewed and are found in Flowsheets. The following problems were reviewed today and where indicated follow up appointments were made and/or referrals ordered.     Positive Risk Factor Screenings with Interventions:             General Health and ACP:  General  In general, how would you say your health is?: Fair  In the past 7 days, have you experienced any of the following: New or Increased Pain, New or Increased Fatigue, Loneliness, Social Isolation, Stress or Anger?: (!) Yes (Right lower back pain)  Select all that apply: (!) New or Increased Pain  Do you get the social and emotional support that you need?: Yes  Do you have a Living Will?: Yes    Advance Directives       Power of  Living Will ACP-Advance Directive ACP-Power of     Not on File Filed on 01/06/16 Filed Not on File          General Health Risk Interventions:  Negative to all of the above. Objective   Vitals:    10/05/22 0942   BP: 133/81   Pulse: 66   Temp: 97.2 °F (36.2 °C)   TempSrc: Temporal   SpO2: 99%   Weight: 170 lb (77.1 kg)   Height: 5' 4\" (1.626 m)      Body mass index is 29.18 kg/m². Allergies   Allergen Reactions    Ace Inhibitors Other (See Comments)     cough    Adhesive Tape Other (See Comments)     Rash,paper tape OK    Sulfa Antibiotics      Other reaction(s): Unknown     Prior to Visit Medications    Medication Sig Taking?  Authorizing Provider   donepezil (ARICEPT) 10 MG tablet  Yes Historical Provider, MD   atorvastatin (LIPITOR) 40 MG tablet  Yes Historical Provider, MD   bumetanide (BUMEX) 1 MG tablet Take 1 tablet by mouth daily Yes Anselmo Rosado MD   candesartan (ATACAND) 32 MG tablet Take 1 tablet by mouth daily Yes Anselmo Rosado MD   levothyroxine (SYNTHROID) 75 MCG tablet Take 1 tablet by mouth Daily Yes Anselmo Rosado MD   metoprolol succinate (TOPROL XL) 50 MG extended release tablet Take 1 tablet by mouth daily Yes Anselmo Rosado MD   potassium chloride (KLOR-CON) 10 MEQ extended release tablet Take 1 tablet by mouth daily Yes Anselmo Rosado MD   docusate (COLACE) 50 MG/5ML liquid Take 50 mg by mouth daily Yes Historical Provider, MD   atorvastatin (LIPITOR) 80 MG tablet Take 80 mg by mouth daily Yes Ar Automatic Reconciliation   vitamin E 1000 units capsule Take 1,000 Units by mouth daily Yes Ar Automatic Reconciliation   aspirin 81 MG EC tablet Take 81 mg by mouth daily  Ar Automatic Reconciliation       CareTeam (Including outside providers/suppliers regularly involved in providing care):   Patient Care Team:  Ursula Almaraz MD as PCP - Robb Mata MD as PCP - [facility] Empaneled Provider     Reviewed and updated this visit:  Tobacco  Allergies  Meds  Problems  Med Hx  Surg Hx  Soc Hx  Fam Hx

## 2022-10-18 ENCOUNTER — OFFICE VISIT (OUTPATIENT)
Dept: ORTHOPEDIC SURGERY | Age: 67
End: 2022-10-18
Payer: MEDICARE

## 2022-10-18 DIAGNOSIS — M51.36 DDD (DEGENERATIVE DISC DISEASE), LUMBAR: ICD-10-CM

## 2022-10-18 DIAGNOSIS — M43.16 SPONDYLOLISTHESIS OF LUMBAR REGION: ICD-10-CM

## 2022-10-18 DIAGNOSIS — M47.816 FACET ARTHROPATHY, LUMBAR: ICD-10-CM

## 2022-10-18 DIAGNOSIS — M54.50 LOW BACK PAIN, UNSPECIFIED BACK PAIN LATERALITY, UNSPECIFIED CHRONICITY, UNSPECIFIED WHETHER SCIATICA PRESENT: Primary | ICD-10-CM

## 2022-10-18 PROCEDURE — 1123F ACP DISCUSS/DSCN MKR DOCD: CPT | Performed by: PHYSICIAN ASSISTANT

## 2022-10-18 PROCEDURE — 99204 OFFICE O/P NEW MOD 45 MIN: CPT | Performed by: PHYSICIAN ASSISTANT

## 2022-10-18 RX ORDER — PREDNISONE 5 MG/1
5 TABLET ORAL SEE ADMIN INSTRUCTIONS
Qty: 1 EACH | Refills: 0 | Status: SHIPPED | OUTPATIENT
Start: 2022-10-18 | End: 2022-10-28

## 2022-10-18 RX ORDER — TIZANIDINE 4 MG/1
2-4 TABLET ORAL EVERY 8 HOURS PRN
Qty: 24 TABLET | Refills: 0 | Status: SHIPPED | OUTPATIENT
Start: 2022-10-18

## 2022-10-18 NOTE — PROGRESS NOTES
Name: Ronald Morse  YOB: 1955  Gender: female  MRN: [id number]    CC: Hip Pain (Right hip pain into low back )       HPI: This is a 69y.o. year old female who reports 3-week history of pain in the lower back is on the right side of the lower back and can radiate across the back with sharp stabbing pain. Occasionally it can radiate into the buttock but it does not radiate down the leg. She has tried Advil and Aleve without significant relief. Activity modification. She has also noticed that her great toe feels like it drags at times. She has not had this trouble before. Has been no bladder or bowel incontinence. She actually had constipation and feels that the symptoms may have begun with that. This patient  has not had lumbar surgery in the past.      AMB PAIN ASSESSMENT 10/18/2022   Location of Pain Hip   Location Modifiers Right   Severity of Pain 8   Quality of Pain Aching   Duration of Pain Persistent   Frequency of Pain Constant   Date Pain First Started 9/20/2022   Limiting Behavior Some   Result of Injury No   Work-Related Injury No   Are there other pain locations you wish to document? No              ROS/Meds/PSH/PMH/FH/SH: I personally reviewed the patient's collected intake data. Below are the pertinents:    Allergies   Allergen Reactions    Ace Inhibitors Other (See Comments)     cough    Adhesive Tape Other (See Comments)     Rash,paper tape OK    Sulfa Antibiotics      Other reaction(s): Unknown         Current Outpatient Medications:     predniSONE 5 MG (48) TBPK, Take 5 mg by mouth See Admin Instructions for 10 days Day 1-4: 2 tablets breakfast, 1 lunch, 1 dinner, 2 tablets bedtime. If started late in the day, take 2 tablets every hour for 3 hours. Day 5-8: 1 tablet breakfast, 1 lunch, 1 dinner, 1 bedtime. Day 9-12: 1 tablet breakfast and 1 bedtime. , Disp: 1 each, Rfl: 0    tiZANidine (ZANAFLEX) 4 MG tablet, Take 0.5-1 tablets by mouth every 8 hours as needed (muscle spasm), Disp: 24 tablet, Rfl: 0    donepezil (ARICEPT) 10 MG tablet, Take 1 tablet by mouth nightly, Disp: 90 tablet, Rfl: 0    atorvastatin (LIPITOR) 40 MG tablet, , Disp: , Rfl:     bumetanide (BUMEX) 1 MG tablet, Take 1 tablet by mouth daily, Disp: 90 tablet, Rfl: 1    candesartan (ATACAND) 32 MG tablet, Take 1 tablet by mouth daily, Disp: 90 tablet, Rfl: 1    levothyroxine (SYNTHROID) 75 MCG tablet, Take 1 tablet by mouth Daily, Disp: 90 tablet, Rfl: 1    metoprolol succinate (TOPROL XL) 50 MG extended release tablet, Take 1 tablet by mouth daily, Disp: 90 tablet, Rfl: 1    potassium chloride (KLOR-CON) 10 MEQ extended release tablet, Take 1 tablet by mouth daily, Disp: 90 tablet, Rfl: 1    docusate (COLACE) 50 MG/5ML liquid, Take 50 mg by mouth daily, Disp: , Rfl:     aspirin 81 MG EC tablet, Take 81 mg by mouth daily, Disp: , Rfl:     vitamin E 1000 units capsule, Take 1,000 Units by mouth daily, Disp: , Rfl:     Past Surgical History:   Procedure Laterality Date    BREAST REDUCTION SURGERY      COLONOSCOPY  2016,2018    + polyp  repeat 2023    HYSTERECTOMY (CERVIX STATUS UNKNOWN)      OTHER SURGICAL HISTORY      tummy tuck    TOTAL KNEE ARTHROPLASTY Right        Patient Active Problem List   Diagnosis    B12 deficiency    Oculomotor nerve palsy    Mixed hyperlipidemia    Essential hypertension    Prediabetes    Acquired hypothyroidism    Chronic diastolic congestive heart failure (HCC)    Lewy body dementia without behavioral disturbance (HCC)    Stage 3a chronic kidney disease (HCC)         Tobacco:  reports that she has never smoked. She has never used smokeless tobacco.  Alcohol:   Social History     Substance and Sexual Activity   Alcohol Use Yes        Physical Exam:   BMI: There is no height or weight on file to calculate BMI.     GENERAL:  Adult in no acute distress, well developed, well nourished Patient is appropriately conversant  MSK:  Examination of the lumbar spine reveals no sagittal or coronal imbalance   There is moderate tenderness to palpation along the spinous processes and paraspinal musculature. The patient ambulates with a normal gait. ROM of bilateral hip(s) reveals no irritability. NEURO:  Cranial nerves grossly intact. No motor deficits. Straight leg testing is negative bilateral  Sensory testing reveals intact sensation to light touch and in the distribution of the L3-S1 dermatomes bilaterally  Ankle jerk is negative for clonus    Reflexes   Right Left   Quadriceps (L4) 2 2   Achilles (S1) 2 2     Strength testing in the lower extremity reveals the following based on the 5 point grading scale:     HF (L2) H Ab (L5) KE (L3/4) ADF (L4) EHL (L5) A Ev (S1) APF (S1)   Right 5 5 5 5 4- 5 5   Left 5 5 5 5 5 5 5     PSYCH:  Alert and oriented X 3. Appropriate affect. Intact judgment and insight. Radiographic Studies:     AP, lateral and spot views of the lumbar spine:  10/18/22    The lumbar spine shows normal alignment. There are lateral osteophytes at L1-2. Sagittal view of the lumbar spine shows anterior osteophytes in the thoracolumbar junction with degenerative changes. There is a slight retrolisthesis L3-4, anterior listhesis L4-5 with bulky facet arthropathy L3-1. X-ray impression: Multilevel facet arthropathy and degenerative changes with anterior listhesis L4-5      Assessment/Plan:       Diagnosis Orders   1. Low back pain, unspecified back pain laterality, unspecified chronicity, unspecified whether sciatica present  XR LUMBAR SPINE (2-3 VIEWS)    Amb External Referral To Physical Therapy      2. DDD (degenerative disc disease), lumbar  Amb External Referral To Physical Therapy      3. Spondylolisthesis of lumbar region  Amb External Referral To Physical Therapy      4. Facet arthropathy, lumbar  Amb External Referral To Physical Therapy            This patient's clinical history and physical exam is consistent with facetogenic back pain.   We reviewed her x-rays that showed her the facet arthritis of the lower lumbar spine and the anterior listhesis L4-5 and I suspect this is a source of her pain. She may even have some right L5 radiculopathy causing some weakness in that distribution. I discussed with her the natural history of this condition in that most episodes are typically self limited. However, the symptoms can last for several months if not even longer. What I currently recommend is that she continues with conservative treatments to help cope with the symptoms. She understands that conservative treatments typically include activity modification, NSAIDs and physical therapy. Oral and/or epidural steroids could be considered in resistant scenarios.       - A home exercise program was prescribed for stretching and strengthening. A list of exercises was provided. - Physical Therapy was prescribed and will include stretching, strengthening and modalities to promote blood flow, flexibility and core strengthening.  - If the patient fails to respond to these efforts, I would recommend MRI of the lumbar spine for further evaluation.  - Oral Steroids: A short course of oral steroids was prescribed in an attempt to bring the acute radicular symptoms under control. The patient understands the risks and side effects of oral steroids including immunosuppression, hypertension, mood swings, increased blood sugar, including glaucoma. The steroid taper can be followed by NSAIDs once completed. - Muscle Relaxant: The patient was prescribed a muscle relaxant. The patient understands that this is a temporary measure to bring acute spasm under control. 4 This is a undiagnosed new problem with uncertain prognosis    Orders Placed This Encounter   Medications    predniSONE 5 MG (48) TBPK     Sig: Take 5 mg by mouth See Admin Instructions for 10 days Day 1-4: 2 tablets breakfast, 1 lunch, 1 dinner, 2 tablets bedtime.   If started late in the day, take 2 tablets every hour for 3 hours. Day 5-8: 1 tablet breakfast, 1 lunch, 1 dinner, 1 bedtime. Day 9-12: 1 tablet breakfast and 1 bedtime. Dispense:  1 each     Refill:  0    tiZANidine (ZANAFLEX) 4 MG tablet     Sig: Take 0.5-1 tablets by mouth every 8 hours as needed (muscle spasm)     Dispense:  24 tablet     Refill:  0        Orders Placed This Encounter   Procedures    XR LUMBAR SPINE (2-3 VIEWS)    Amb External Referral To Physical Therapy              Return in about 6 weeks (around 11/29/2022). Srinivasan Smallwood PA-C  10/18/22      Elements of this note were created using speech recognition software. As such, errors of speech recognition may be present.

## 2022-10-18 NOTE — LETTER
Lauren STOCKT  1955  MRN [id number]                                              ROOM NUMBER______      Radiographic Studies:    Cervical MRI      Thoracic MRI         Lumbar MRI          Pelvis MRI        CONTRAST    CT Myelogram: _______________   NCS/EMG ________________ ( UE  /  LE )     MRI of ___________________          Other: ____________________      Injections:    KNEE    HIP  Depomedrol _____ mg Euflexxa _____    _______________ TFESI/SNRB  _______________ SI Joint  _______________ JEFF    _______________ Facet  _______________Piriformis/ Sciatica      Medications:    Oral Steroids _______________  NSAIDS _______________    Muscle Relaxers _______________  Neurontin/Lyrica _______________    Pain Medicine _______________  Other _______________                       Physical Therapy:    Lumbar     Thoracic      Cervical     Hip       Knee       Shoulder               Traction          Ultrasound          Dry Needling      Referral:    Pain referral:  [facility]   [facility]   Other: ______________________________    Follow-up/ Refer__________________________________________________    Authorization to hold blood thinners:___________________________________

## 2022-11-29 ENCOUNTER — OFFICE VISIT (OUTPATIENT)
Dept: ORTHOPEDIC SURGERY | Age: 67
End: 2022-11-29
Payer: MEDICARE

## 2022-11-29 DIAGNOSIS — M43.16 SPONDYLOLISTHESIS OF LUMBAR REGION: Primary | ICD-10-CM

## 2022-11-29 DIAGNOSIS — M51.36 DDD (DEGENERATIVE DISC DISEASE), LUMBAR: ICD-10-CM

## 2022-11-29 DIAGNOSIS — M47.816 FACET ARTHROPATHY, LUMBAR: ICD-10-CM

## 2022-11-29 PROCEDURE — 1123F ACP DISCUSS/DSCN MKR DOCD: CPT | Performed by: PHYSICIAN ASSISTANT

## 2022-11-29 PROCEDURE — 99213 OFFICE O/P EST LOW 20 MIN: CPT | Performed by: PHYSICIAN ASSISTANT

## 2022-11-29 NOTE — LETTER
Edith Esteves                                                     ZORAIDA ARZATE  1955  MRN [id number]                                              ROOM NUMBER______      Radiographic Studies:    Cervical MRI      Thoracic MRI         Lumbar MRI          Pelvis MRI        CONTRAST    CT Myelogram: _______________   NCS/EMG ________________ ( UE  /  LE )     MRI of ___________________          Other: ____________________      Injections:    KNEE    HIP  Depomedrol _____ mg Euflexxa _____    _______________ TFESI/SNRB  _______________ SI Joint  _______________ JEFF    _______________ Facet  _______________Piriformis/ Sciatica      Medications:    Oral Steroids _______________  NSAIDS _______________    Muscle Relaxers _______________  Neurontin/Lyrica _______________    Pain Medicine _______________  Other _______________                       Physical Therapy:    Lumbar     Thoracic      Cervical     Hip       Knee       Shoulder               Traction          Ultrasound          Dry Needling      Referral:    Pain referral:  [facility]   [facility]   Other: ______________________________    Follow-up/ Refer__________________________________________________    Authorization to hold blood thinners:___________________________________

## 2022-11-29 NOTE — PROGRESS NOTES
Name: Dilma Barahona  YOB: 1955  Gender: female  MRN: [id number]    CC: Back Pain (Recheck after PT)       HPI: This is a 69y.o. year old female who presented in October with pain in the lower back is on the right side of the lower back and can radiate across the back with sharp stabbing pain. Occasionally it can radiate into the buttock but it does not radiate down the leg. She has tried Advil and Aleve without significant relief. Activity modification. She has also noticed that her great toe feels like it drags at times. She has not had this trouble before. Has been no bladder or bowel incontinence. She actually had constipation and feels that the symptoms may have begun with that. Symptoms have improved after beginning a bowel regimen. We did prescribe oral steroid she did not tolerate that as well. We prescribed physical therapy. She is completed 6 weeks of physical therapy. She is doing much better. Pain level is currently 2 out of 10. This patient  has not had lumbar surgery in the past.      AMB PAIN ASSESSMENT 10/18/2022   Location of Pain Hip   Location Modifiers Right   Severity of Pain 8   Quality of Pain Aching   Duration of Pain Persistent   Frequency of Pain Constant   Date Pain First Started 9/20/2022   Limiting Behavior Some   Result of Injury No   Work-Related Injury No   Are there other pain locations you wish to document? No              ROS/Meds/PSH/PMH/FH/SH: I personally reviewed the patient's collected intake data.   Below are the pertinents:    Allergies   Allergen Reactions    Ace Inhibitors Other (See Comments)     cough    Adhesive Tape Other (See Comments)     Rash,paper tape OK    Sulfa Antibiotics      Other reaction(s): Unknown         Current Outpatient Medications:     tiZANidine (ZANAFLEX) 4 MG tablet, Take 0.5-1 tablets by mouth every 8 hours as needed (muscle spasm), Disp: 24 tablet, Rfl: 0    donepezil (ARICEPT) 10 MG tablet, Take 1 tablet by mouth nightly, Disp: 90 tablet, Rfl: 0    atorvastatin (LIPITOR) 40 MG tablet, , Disp: , Rfl:     bumetanide (BUMEX) 1 MG tablet, Take 1 tablet by mouth daily, Disp: 90 tablet, Rfl: 1    candesartan (ATACAND) 32 MG tablet, Take 1 tablet by mouth daily, Disp: 90 tablet, Rfl: 1    levothyroxine (SYNTHROID) 75 MCG tablet, Take 1 tablet by mouth Daily, Disp: 90 tablet, Rfl: 1    metoprolol succinate (TOPROL XL) 50 MG extended release tablet, Take 1 tablet by mouth daily, Disp: 90 tablet, Rfl: 1    potassium chloride (KLOR-CON) 10 MEQ extended release tablet, Take 1 tablet by mouth daily, Disp: 90 tablet, Rfl: 1    docusate (COLACE) 50 MG/5ML liquid, Take 50 mg by mouth daily, Disp: , Rfl:     aspirin 81 MG EC tablet, Take 81 mg by mouth daily, Disp: , Rfl:     vitamin E 1000 units capsule, Take 1,000 Units by mouth daily, Disp: , Rfl:     Past Surgical History:   Procedure Laterality Date    BREAST REDUCTION SURGERY      COLONOSCOPY  2016,2018    + polyp  repeat 2023    HYSTERECTOMY (CERVIX STATUS UNKNOWN)      OTHER SURGICAL HISTORY      tummy tuck    TOTAL KNEE ARTHROPLASTY Right        Patient Active Problem List   Diagnosis    B12 deficiency    Oculomotor nerve palsy    Mixed hyperlipidemia    Essential hypertension    Prediabetes    Acquired hypothyroidism    Chronic diastolic congestive heart failure (HCC)    Lewy body dementia without behavioral disturbance (HCC)    Stage 3a chronic kidney disease (HCC)         Tobacco:  reports that she has never smoked. She has never used smokeless tobacco.  Alcohol:   Social History     Substance and Sexual Activity   Alcohol Use Yes        Radiographic Studies:     AP, lateral and spot views of the lumbar spine:  10/18/22    The lumbar spine shows normal alignment. There are lateral osteophytes at L1-2. Sagittal view of the lumbar spine shows anterior osteophytes in the thoracolumbar junction with degenerative changes.   There is a slight retrolisthesis L3-4, anterior listhesis L4-5 with bulky facet arthropathy L3-1. X-ray impression: Multilevel facet arthropathy and degenerative changes with anterior listhesis L4-5      Assessment/Plan:       Diagnosis Orders   1. Spondylolisthesis of lumbar region        2. Facet arthropathy, lumbar        3. DDD (degenerative disc disease), lumbar                This patient's clinical history and physical exam is consistent with facetogenic back pain. We reviewed her x-rays that showed her the facet arthritis of the lower lumbar spine and the anterior listhesis L4-5 and I suspect this is a source of her pain. She may even have some right L5 radiculopathy causing some weakness in that distribution. I discussed with her the natural history of this condition in that most episodes are typically self limited. However, the symptoms can last for several months if not even longer. What I currently recommend is that she continues with conservative treatments to help cope with the symptoms. She understands that conservative treatments typically include activity modification, NSAIDs and physical therapy. Oral and/or epidural steroids could be considered in resistant scenarios.       - A home exercise program was prescribed for stretching and strengthening. A list of exercises was provided. - If the patient fails to respond to these efforts, I would recommend MRI of the lumbar spine for further evaluation. 3 This is stable chronic illness/condition    No orders of the defined types were placed in this encounter. No orders of the defined types were placed in this encounter. No follow-ups on file. Connor Clark PA-C  11/29/22      Elements of this note were created using speech recognition software. As such, errors of speech recognition may be present.

## 2022-12-21 DIAGNOSIS — I10 ESSENTIAL HYPERTENSION: ICD-10-CM

## 2022-12-21 RX ORDER — CANDESARTAN 32 MG/1
32 TABLET ORAL DAILY
Qty: 90 TABLET | Refills: 1 | Status: CANCELLED | OUTPATIENT
Start: 2022-12-21

## 2023-01-06 ENCOUNTER — OFFICE VISIT (OUTPATIENT)
Dept: INTERNAL MEDICINE CLINIC | Facility: CLINIC | Age: 68
End: 2023-01-06
Payer: MEDICARE

## 2023-01-06 VITALS
TEMPERATURE: 97.1 F | OXYGEN SATURATION: 100 % | HEIGHT: 64 IN | SYSTOLIC BLOOD PRESSURE: 133 MMHG | HEART RATE: 60 BPM | WEIGHT: 173 LBS | DIASTOLIC BLOOD PRESSURE: 61 MMHG | BODY MASS INDEX: 29.53 KG/M2

## 2023-01-06 DIAGNOSIS — N18.31 STAGE 3A CHRONIC KIDNEY DISEASE (HCC): ICD-10-CM

## 2023-01-06 DIAGNOSIS — E87.6 HYPOKALEMIA: ICD-10-CM

## 2023-01-06 DIAGNOSIS — F02.80 LEWY BODY DEMENTIA WITHOUT BEHAVIORAL DISTURBANCE (HCC): ICD-10-CM

## 2023-01-06 DIAGNOSIS — E03.9 ACQUIRED HYPOTHYROIDISM: ICD-10-CM

## 2023-01-06 DIAGNOSIS — I50.32 CHRONIC DIASTOLIC CONGESTIVE HEART FAILURE (HCC): ICD-10-CM

## 2023-01-06 DIAGNOSIS — R73.03 PREDIABETES: ICD-10-CM

## 2023-01-06 DIAGNOSIS — E78.2 MIXED HYPERLIPIDEMIA: ICD-10-CM

## 2023-01-06 DIAGNOSIS — I10 ESSENTIAL HYPERTENSION: Primary | ICD-10-CM

## 2023-01-06 DIAGNOSIS — G31.83 LEWY BODY DEMENTIA WITHOUT BEHAVIORAL DISTURBANCE (HCC): ICD-10-CM

## 2023-01-06 DIAGNOSIS — I10 ESSENTIAL HYPERTENSION: ICD-10-CM

## 2023-01-06 LAB
ANION GAP SERPL CALC-SCNC: 4 MMOL/L (ref 2–11)
BUN SERPL-MCNC: 41 MG/DL (ref 8–23)
CALCIUM SERPL-MCNC: 9.5 MG/DL (ref 8.3–10.4)
CHLORIDE SERPL-SCNC: 109 MMOL/L (ref 101–110)
CO2 SERPL-SCNC: 29 MMOL/L (ref 21–32)
CREAT SERPL-MCNC: 1.3 MG/DL (ref 0.6–1)
GLUCOSE SERPL-MCNC: 81 MG/DL (ref 65–100)
POTASSIUM SERPL-SCNC: 4.4 MMOL/L (ref 3.5–5.1)
SODIUM SERPL-SCNC: 142 MMOL/L (ref 133–143)

## 2023-01-06 PROCEDURE — 3078F DIAST BP <80 MM HG: CPT | Performed by: INTERNAL MEDICINE

## 2023-01-06 PROCEDURE — 99214 OFFICE O/P EST MOD 30 MIN: CPT | Performed by: INTERNAL MEDICINE

## 2023-01-06 PROCEDURE — 1123F ACP DISCUSS/DSCN MKR DOCD: CPT | Performed by: INTERNAL MEDICINE

## 2023-01-06 PROCEDURE — 3075F SYST BP GE 130 - 139MM HG: CPT | Performed by: INTERNAL MEDICINE

## 2023-01-06 RX ORDER — ATORVASTATIN CALCIUM 40 MG/1
40 TABLET, FILM COATED ORAL DAILY
Qty: 90 TABLET | Refills: 1 | Status: SHIPPED | OUTPATIENT
Start: 2023-01-06

## 2023-01-06 RX ORDER — DONEPEZIL HYDROCHLORIDE 10 MG/1
10 TABLET, FILM COATED ORAL NIGHTLY
Qty: 90 TABLET | Refills: 1 | Status: SHIPPED | OUTPATIENT
Start: 2023-01-06

## 2023-01-06 RX ORDER — METOPROLOL SUCCINATE 50 MG/1
50 TABLET, EXTENDED RELEASE ORAL DAILY
Qty: 90 TABLET | Refills: 1 | Status: SHIPPED | OUTPATIENT
Start: 2023-01-06

## 2023-01-06 RX ORDER — IVERMECTIN 10 MG/G
CREAM TOPICAL
COMMUNITY
Start: 2023-01-05

## 2023-01-06 RX ORDER — BUMETANIDE 1 MG/1
1 TABLET ORAL DAILY
Qty: 90 TABLET | Refills: 1 | Status: SHIPPED | OUTPATIENT
Start: 2023-01-06

## 2023-01-06 RX ORDER — CANDESARTAN 32 MG/1
32 TABLET ORAL DAILY
Qty: 90 TABLET | Refills: 1 | Status: SHIPPED | OUTPATIENT
Start: 2023-01-06

## 2023-01-06 RX ORDER — POTASSIUM CHLORIDE 750 MG/1
10 TABLET, FILM COATED, EXTENDED RELEASE ORAL DAILY
Qty: 90 TABLET | Refills: 1 | Status: SHIPPED | OUTPATIENT
Start: 2023-01-06

## 2023-01-06 RX ORDER — LEVOTHYROXINE SODIUM 0.07 MG/1
75 TABLET ORAL DAILY
Qty: 90 TABLET | Refills: 1 | Status: SHIPPED | OUTPATIENT
Start: 2023-01-06

## 2023-01-06 RX ORDER — MEMANTINE HYDROCHLORIDE 5 MG-10 MG
KIT ORAL
COMMUNITY
Start: 2023-01-05 | End: 2023-02-02

## 2023-01-06 ASSESSMENT — PATIENT HEALTH QUESTIONNAIRE - PHQ9
SUM OF ALL RESPONSES TO PHQ QUESTIONS 1-9: 0
SUM OF ALL RESPONSES TO PHQ QUESTIONS 1-9: 0
SUM OF ALL RESPONSES TO PHQ9 QUESTIONS 1 & 2: 0
SUM OF ALL RESPONSES TO PHQ QUESTIONS 1-9: 0
SUM OF ALL RESPONSES TO PHQ QUESTIONS 1-9: 0
2. FEELING DOWN, DEPRESSED OR HOPELESS: 0
1. LITTLE INTEREST OR PLEASURE IN DOING THINGS: 0

## 2023-01-06 ASSESSMENT — ANXIETY QUESTIONNAIRES
1. FEELING NERVOUS, ANXIOUS, OR ON EDGE: 0
2. NOT BEING ABLE TO STOP OR CONTROL WORRYING: 0
GAD7 TOTAL SCORE: 0
5. BEING SO RESTLESS THAT IT IS HARD TO SIT STILL: 0
3. WORRYING TOO MUCH ABOUT DIFFERENT THINGS: 0
7. FEELING AFRAID AS IF SOMETHING AWFUL MIGHT HAPPEN: 0
6. BECOMING EASILY ANNOYED OR IRRITABLE: 0
IF YOU CHECKED OFF ANY PROBLEMS ON THIS QUESTIONNAIRE, HOW DIFFICULT HAVE THESE PROBLEMS MADE IT FOR YOU TO DO YOUR WORK, TAKE CARE OF THINGS AT HOME, OR GET ALONG WITH OTHER PEOPLE: NOT DIFFICULT AT ALL
4. TROUBLE RELAXING: 0

## 2023-01-06 ASSESSMENT — ENCOUNTER SYMPTOMS
SHORTNESS OF BREATH: 0
BLOOD IN STOOL: 0

## 2023-01-06 NOTE — PROGRESS NOTES
Bridgett Sanchez M.D. Internal Medicine  [address], [address]  Phone: [phone]  Fax: [phone]    Hypertension  This is a chronic problem. The current episode started more than 1 year ago. The problem has been waxing and waning since onset. The problem is controlled. Pertinent negatives include no chest pain or shortness of breath. There are no associated agents to hypertension. Risk factors for coronary artery disease include dyslipidemia, obesity and post-menopausal state. Past treatments include angiotensin blockers, beta blockers and diuretics. The current treatment provides moderate improvement. Michael Becerril is a 69 y.o. White (non-) female. Current Outpatient Medications   Medication Sig Dispense Refill    Ivermectin 1 % CREA       memantine (NAMENDA TITRATION PACK) 28 x 5 MG & 21 x 10 MG tablet pack Follow package directions. tiZANidine (ZANAFLEX) 4 MG tablet Take 0.5-1 tablets by mouth every 8 hours as needed (muscle spasm) 24 tablet 0    donepezil (ARICEPT) 10 MG tablet Take 1 tablet by mouth nightly 90 tablet 0    atorvastatin (LIPITOR) 40 MG tablet       bumetanide (BUMEX) 1 MG tablet Take 1 tablet by mouth daily 90 tablet 1    candesartan (ATACAND) 32 MG tablet Take 1 tablet by mouth daily 90 tablet 1    levothyroxine (SYNTHROID) 75 MCG tablet Take 1 tablet by mouth Daily 90 tablet 1    metoprolol succinate (TOPROL XL) 50 MG extended release tablet Take 1 tablet by mouth daily 90 tablet 1    potassium chloride (KLOR-CON) 10 MEQ extended release tablet Take 1 tablet by mouth daily 90 tablet 1    docusate (COLACE) 50 MG/5ML liquid Take 50 mg by mouth daily      vitamin E 1000 units capsule Take 1,000 Units by mouth daily      aspirin 81 MG EC tablet Take 81 mg by mouth daily       No current facility-administered medications for this visit.      Allergies   Allergen Reactions    Ace Inhibitors Other (See Comments)     cough Adhesive Tape Other (See Comments)     Rash,paper tape OK    Sulfa Antibiotics      Other reaction(s): Unknown     Past Medical History:   Diagnosis Date    Acquired hypothyroidism 3/21/2022    Arthritis     Chronic diastolic congestive heart failure ([address] 3/21/2022    Chronic renal disease, stage III (HCC)     GERD (gastroesophageal reflux disease)     well controlled with medication    Hypercholesteremia     medication    Hypertension     well controlled with medication    Hypokalemia 3/21/2022    Obesity      Past Surgical History:   Procedure Laterality Date    BREAST REDUCTION SURGERY      COLONOSCOPY  2016,2018    + polyp  repeat 2023    HYSTERECTOMY (CERVIX STATUS UNKNOWN)      OTHER SURGICAL HISTORY      tummy tuck    TOTAL KNEE ARTHROPLASTY Right      Social History     Tobacco Use    Smoking status: Never    Smokeless tobacco: Never   Substance Use Topics    Alcohol use: Yes    Drug use: No     Family History   Problem Relation Age of Onset    Diabetes Father     Hypertension Mother     Dementia Mother     Hypertension Father     Parkinson's Disease Paternal Grandmother     Breast Cancer Neg Hx       Review of Systems   Respiratory:  Negative for shortness of breath. Cardiovascular:  Negative for chest pain. Gastrointestinal:  Negative for blood in stool. Physical Exam  Vitals and nursing note reviewed. Constitutional:       General: She is not in acute distress. Appearance: Normal appearance. She is not ill-appearing, toxic-appearing or diaphoretic. HENT:      Head: Normocephalic and atraumatic. Right Ear: External ear normal.      Left Ear: External ear normal.   Eyes:      General: No scleral icterus. Right eye: No discharge. Left eye: No discharge. Conjunctiva/sclera: Conjunctivae normal.   Cardiovascular:      Rate and Rhythm: Regular rhythm. Heart sounds: Normal heart sounds. No murmur heard. No friction rub. No gallop.    Pulmonary:      Effort: Pulmonary effort is normal. No respiratory distress. Breath sounds: Normal breath sounds. No stridor. No wheezing, rhonchi or rales. Abdominal:      General: Abdomen is flat. Bowel sounds are normal. There is no distension. Palpations: Abdomen is soft. There is no mass. Tenderness: There is no abdominal tenderness. There is no guarding or rebound. Musculoskeletal:      Right lower leg: No edema. Left lower leg: No edema. Skin:     General: Skin is warm and dry. Coloration: Skin is not jaundiced or pale. Findings: No erythema. Neurological:      General: No focal deficit present. Mental Status: She is alert and oriented to person, place, and time. Mental status is at baseline. Psychiatric:         Mood and Affect: Mood normal.         Behavior: Behavior normal.         Thought Content: Thought content normal.         Judgment: Judgment normal.   I have reviewed/discussed the above normal BMI with the patient. I have recommended the following interventions: dietary management education, guidance, and counseling and encourage exercise . ASSESSMENT AND PLAN:    HTN: Taking current regimen (Atacand 32, Bumex 1 QDay, Toprol XL 50) w/o problems. Home BPs = 130-140/70-80. Well controlled. Continue current regimen. HLD: Taking current regimen (Lipitor 80) w/o problems. Well controlled. Continue current regimen. FLPs:   3/21/22 on Lipitor 80 = [144/68/43/198]. Hypothyroidism: Taking current regimen (Synthroid 75) w/o problems. Well controlled. Continue current regimen. TSHs:  3/21/22 on Synthroid 75 = 0.865.   10/5/22 on Synthroid 75 = 0.939. Prediabetes: Stable. Continue to monitor. A1C's:  1/21/16 = 6.2.  10/5/22 = 5.6. CKD3: Baseline Cr ? 1-1.6 and stable (Since 3/2022). Diastolic CHF: Follows with Cardiology (Dr. Duong Perez at [facility]).    Hypokalemia: Taking current regimen (PO KCl 10 QDay) w/o problems  Cognitive Impairment: Diagnosed with Lewy Body Dementia. Follows with Neurology (At [facility]). HCM:   CRC Screenin18 by Dr. Angel Lacy = IH/EH; 1 Fernando (HP); 5 Year Repeat. Mammogram: 22 = Neg.   Flu: Recommend yearly flu vaccine to lower risk for preventable morbidity/mortality from the flu. Covid: Recommend staying UTD on Covid vaccinations/boosters. F/u: 3 Months. Fasting labs at that visit. Joyce Goff was seen today for hypertension and cholesterol problem. Diagnoses and all orders for this visit:    Essential hypertension  -     bumetanide (BUMEX) 1 MG tablet; Take 1 tablet by mouth daily  -     candesartan (ATACAND) 32 MG tablet; Take 1 tablet by mouth daily  -     metoprolol succinate (TOPROL XL) 50 MG extended release tablet; Take 1 tablet by mouth daily  -     Basic Metabolic Panel; Future    Mixed hyperlipidemia  -     atorvastatin (LIPITOR) 40 MG tablet; Take 1 tablet by mouth daily  -     Basic Metabolic Panel; Future    Acquired hypothyroidism  -     levothyroxine (SYNTHROID) 75 MCG tablet; Take 1 tablet by mouth Daily    Prediabetes  -     Basic Metabolic Panel; Future    Stage 3a chronic kidney disease ([facility]  -     Basic Metabolic Panel; Future    Chronic diastolic congestive heart failure ([facility]  -     Basic Metabolic Panel; Future    Hypokalemia  -     potassium chloride (KLOR-CON) 10 MEQ extended release tablet; Take 1 tablet by mouth daily  -     Basic Metabolic Panel; Future    Lewy body dementia without behavioral disturbance (HCC)  -     donepezil (ARICEPT) 10 MG tablet; Take 1 tablet by mouth nightly  -     Basic Metabolic Panel; Future    Other orders  -     docusate (COLACE) 50 MG/5ML liquid; Take 5 mLs by mouth daily Take 50 mg by mouth daily    No follow-ups on file.

## 2023-04-07 ENCOUNTER — TELEPHONE (OUTPATIENT)
Dept: INTERNAL MEDICINE CLINIC | Facility: CLINIC | Age: 68
End: 2023-04-07

## 2023-04-07 NOTE — TELEPHONE ENCOUNTER
Attempted to call patient jenni to her missed appointment on Friday, 04/07/2023. Patients phone was a very fast busy signal.  Phone never rang and no voicemail.